# Patient Record
Sex: MALE | Race: BLACK OR AFRICAN AMERICAN | NOT HISPANIC OR LATINO | Employment: STUDENT | URBAN - METROPOLITAN AREA
[De-identification: names, ages, dates, MRNs, and addresses within clinical notes are randomized per-mention and may not be internally consistent; named-entity substitution may affect disease eponyms.]

---
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
## 2021-06-22 ENCOUNTER — OFFICE VISIT (OUTPATIENT)
Dept: FAMILY MEDICINE | Facility: CLINIC | Age: 21
End: 2021-06-22
Payer: COMMERCIAL

## 2021-06-22 ENCOUNTER — LAB VISIT (OUTPATIENT)
Dept: LAB | Facility: HOSPITAL | Age: 21
End: 2021-06-22
Attending: FAMILY MEDICINE
Payer: COMMERCIAL

## 2021-06-22 VITALS
SYSTOLIC BLOOD PRESSURE: 124 MMHG | WEIGHT: 220.69 LBS | HEART RATE: 60 BPM | OXYGEN SATURATION: 98 % | BODY MASS INDEX: 34.64 KG/M2 | DIASTOLIC BLOOD PRESSURE: 84 MMHG | HEIGHT: 67 IN

## 2021-06-22 DIAGNOSIS — Z11.4 ENCOUNTER FOR SCREENING FOR HIV: ICD-10-CM

## 2021-06-22 DIAGNOSIS — E66.09 CLASS 1 OBESITY DUE TO EXCESS CALORIES WITH BODY MASS INDEX (BMI) OF 34.0 TO 34.9 IN ADULT, UNSPECIFIED WHETHER SERIOUS COMORBIDITY PRESENT: ICD-10-CM

## 2021-06-22 DIAGNOSIS — Z11.3 ROUTINE SCREENING FOR STI (SEXUALLY TRANSMITTED INFECTION): ICD-10-CM

## 2021-06-22 DIAGNOSIS — Z00.01 ENCOUNTER FOR GENERAL ADULT MEDICAL EXAMINATION WITH ABNORMAL FINDINGS: ICD-10-CM

## 2021-06-22 DIAGNOSIS — Z00.01 ENCOUNTER FOR GENERAL ADULT MEDICAL EXAMINATION WITH ABNORMAL FINDINGS: Primary | ICD-10-CM

## 2021-06-22 DIAGNOSIS — J45.20 MILD INTERMITTENT ASTHMA WITHOUT COMPLICATION: ICD-10-CM

## 2021-06-22 DIAGNOSIS — K59.09 CHRONIC CONSTIPATION: ICD-10-CM

## 2021-06-22 DIAGNOSIS — F90.2 ATTENTION DEFICIT HYPERACTIVITY DISORDER (ADHD), COMBINED TYPE: ICD-10-CM

## 2021-06-22 DIAGNOSIS — Z11.59 NEED FOR HEPATITIS C SCREENING TEST: ICD-10-CM

## 2021-06-22 PROBLEM — E66.811 CLASS 1 OBESITY DUE TO EXCESS CALORIES WITH BODY MASS INDEX (BMI) OF 34.0 TO 34.9 IN ADULT: Status: ACTIVE | Noted: 2021-06-22

## 2021-06-22 LAB
BASOPHILS # BLD AUTO: 0.05 K/UL (ref 0–0.2)
BASOPHILS NFR BLD: 1.1 % (ref 0–1.9)
DIFFERENTIAL METHOD: NORMAL
EOSINOPHIL # BLD AUTO: 0.1 K/UL (ref 0–0.5)
EOSINOPHIL NFR BLD: 2.2 % (ref 0–8)
ERYTHROCYTE [DISTWIDTH] IN BLOOD BY AUTOMATED COUNT: 12.5 % (ref 11.5–14.5)
HCT VFR BLD AUTO: 50 % (ref 40–54)
HGB BLD-MCNC: 16.2 G/DL (ref 14–18)
IMM GRANULOCYTES # BLD AUTO: 0.02 K/UL (ref 0–0.04)
IMM GRANULOCYTES NFR BLD AUTO: 0.4 % (ref 0–0.5)
LYMPHOCYTES # BLD AUTO: 1.5 K/UL (ref 1–4.8)
LYMPHOCYTES NFR BLD: 33.3 % (ref 18–48)
MCH RBC QN AUTO: 27.6 PG (ref 27–31)
MCHC RBC AUTO-ENTMCNC: 32.4 G/DL (ref 32–36)
MCV RBC AUTO: 85 FL (ref 82–98)
MONOCYTES # BLD AUTO: 0.5 K/UL (ref 0.3–1)
MONOCYTES NFR BLD: 10.4 % (ref 4–15)
NEUTROPHILS # BLD AUTO: 2.4 K/UL (ref 1.8–7.7)
NEUTROPHILS NFR BLD: 52.6 % (ref 38–73)
NRBC BLD-RTO: 0 /100 WBC
PLATELET # BLD AUTO: 249 K/UL (ref 150–450)
PMV BLD AUTO: 11 FL (ref 9.2–12.9)
RBC # BLD AUTO: 5.88 M/UL (ref 4.6–6.2)
WBC # BLD AUTO: 4.62 K/UL (ref 3.9–12.7)

## 2021-06-22 PROCEDURE — 99999 PR PBB SHADOW E&M-EST. PATIENT-LVL III: CPT | Mod: PBBFAC,,, | Performed by: FAMILY MEDICINE

## 2021-06-22 PROCEDURE — 80061 LIPID PANEL: CPT | Performed by: FAMILY MEDICINE

## 2021-06-22 PROCEDURE — 83036 HEMOGLOBIN GLYCOSYLATED A1C: CPT | Performed by: FAMILY MEDICINE

## 2021-06-22 PROCEDURE — 85025 COMPLETE CBC W/AUTO DIFF WBC: CPT | Performed by: FAMILY MEDICINE

## 2021-06-22 PROCEDURE — 80074 ACUTE HEPATITIS PANEL: CPT | Performed by: FAMILY MEDICINE

## 2021-06-22 PROCEDURE — 3008F BODY MASS INDEX DOCD: CPT | Mod: CPTII,S$GLB,, | Performed by: FAMILY MEDICINE

## 2021-06-22 PROCEDURE — 3008F PR BODY MASS INDEX (BMI) DOCUMENTED: ICD-10-PCS | Mod: CPTII,S$GLB,, | Performed by: FAMILY MEDICINE

## 2021-06-22 PROCEDURE — 86703 HIV-1/HIV-2 1 RESULT ANTBDY: CPT | Performed by: FAMILY MEDICINE

## 2021-06-22 PROCEDURE — 86592 SYPHILIS TEST NON-TREP QUAL: CPT | Performed by: FAMILY MEDICINE

## 2021-06-22 PROCEDURE — 80053 COMPREHEN METABOLIC PANEL: CPT | Performed by: FAMILY MEDICINE

## 2021-06-22 PROCEDURE — 84443 ASSAY THYROID STIM HORMONE: CPT | Performed by: FAMILY MEDICINE

## 2021-06-22 PROCEDURE — 82306 VITAMIN D 25 HYDROXY: CPT | Performed by: FAMILY MEDICINE

## 2021-06-22 PROCEDURE — 1126F PR PAIN SEVERITY QUANTIFIED, NO PAIN PRESENT: ICD-10-PCS | Mod: S$GLB,,, | Performed by: FAMILY MEDICINE

## 2021-06-22 PROCEDURE — 99999 PR PBB SHADOW E&M-EST. PATIENT-LVL III: ICD-10-PCS | Mod: PBBFAC,,, | Performed by: FAMILY MEDICINE

## 2021-06-22 PROCEDURE — 1126F AMNT PAIN NOTED NONE PRSNT: CPT | Mod: S$GLB,,, | Performed by: FAMILY MEDICINE

## 2021-06-22 PROCEDURE — 99395 PREV VISIT EST AGE 18-39: CPT | Mod: S$GLB,,, | Performed by: FAMILY MEDICINE

## 2021-06-22 PROCEDURE — 99395 PR PREVENTIVE VISIT,EST,18-39: ICD-10-PCS | Mod: S$GLB,,, | Performed by: FAMILY MEDICINE

## 2021-06-22 PROCEDURE — 36415 COLL VENOUS BLD VENIPUNCTURE: CPT | Mod: PO | Performed by: FAMILY MEDICINE

## 2021-06-22 RX ORDER — ALBUTEROL SULFATE 90 UG/1
2 AEROSOL, METERED RESPIRATORY (INHALATION) EVERY 6 HOURS PRN
Qty: 18 G | Refills: 2 | Status: SHIPPED | OUTPATIENT
Start: 2021-06-22 | End: 2021-12-21 | Stop reason: SDUPTHER

## 2021-06-22 RX ORDER — POLYETHYLENE GLYCOL 3350 17 G/17G
POWDER, FOR SOLUTION ORAL
Qty: 510 G | Refills: 3 | Status: SHIPPED | OUTPATIENT
Start: 2021-06-22 | End: 2021-12-21

## 2021-06-22 RX ORDER — LISDEXAMFETAMINE DIMESYLATE CAPSULES 20 MG/1
20 CAPSULE ORAL EVERY MORNING
Qty: 90 CAPSULE | Refills: 0 | Status: SHIPPED | OUTPATIENT
Start: 2021-06-22 | End: 2021-09-21 | Stop reason: SDUPTHER

## 2021-06-23 LAB
25(OH)D3+25(OH)D2 SERPL-MCNC: 15 NG/ML (ref 30–96)
ALBUMIN SERPL BCP-MCNC: 4.3 G/DL (ref 3.5–5.2)
ALP SERPL-CCNC: 54 U/L (ref 55–135)
ALT SERPL W/O P-5'-P-CCNC: 24 U/L (ref 10–44)
ANION GAP SERPL CALC-SCNC: 12 MMOL/L (ref 8–16)
AST SERPL-CCNC: 25 U/L (ref 10–40)
BILIRUB SERPL-MCNC: 0.9 MG/DL (ref 0.1–1)
BUN SERPL-MCNC: 12 MG/DL (ref 6–20)
CALCIUM SERPL-MCNC: 10 MG/DL (ref 8.7–10.5)
CHLORIDE SERPL-SCNC: 102 MMOL/L (ref 95–110)
CHOLEST SERPL-MCNC: 143 MG/DL (ref 120–199)
CHOLEST/HDLC SERPL: 2.6 {RATIO} (ref 2–5)
CO2 SERPL-SCNC: 23 MMOL/L (ref 23–29)
CREAT SERPL-MCNC: 1 MG/DL (ref 0.5–1.4)
EST. GFR  (AFRICAN AMERICAN): >60 ML/MIN/1.73 M^2
EST. GFR  (NON AFRICAN AMERICAN): >60 ML/MIN/1.73 M^2
ESTIMATED AVG GLUCOSE: 103 MG/DL (ref 68–131)
GLUCOSE SERPL-MCNC: 83 MG/DL (ref 70–110)
HAV IGM SERPL QL IA: NEGATIVE
HBA1C MFR BLD: 5.2 % (ref 4–5.6)
HBV CORE IGM SERPL QL IA: NEGATIVE
HBV SURFACE AG SERPL QL IA: NEGATIVE
HCV AB SERPL QL IA: NEGATIVE
HDLC SERPL-MCNC: 56 MG/DL (ref 40–75)
HDLC SERPL: 39.2 % (ref 20–50)
HIV 1+2 AB+HIV1 P24 AG SERPL QL IA: NEGATIVE
LDLC SERPL CALC-MCNC: 72 MG/DL (ref 63–159)
NONHDLC SERPL-MCNC: 87 MG/DL
POTASSIUM SERPL-SCNC: 4.2 MMOL/L (ref 3.5–5.1)
PROT SERPL-MCNC: 8.2 G/DL (ref 6–8.4)
RPR SER QL: NORMAL
SODIUM SERPL-SCNC: 137 MMOL/L (ref 136–145)
TRIGL SERPL-MCNC: 75 MG/DL (ref 30–150)
TSH SERPL DL<=0.005 MIU/L-ACNC: 1.1 UIU/ML (ref 0.4–4)

## 2021-09-21 ENCOUNTER — PATIENT MESSAGE (OUTPATIENT)
Dept: FAMILY MEDICINE | Facility: CLINIC | Age: 21
End: 2021-09-21

## 2021-09-21 ENCOUNTER — OFFICE VISIT (OUTPATIENT)
Dept: FAMILY MEDICINE | Facility: CLINIC | Age: 21
End: 2021-09-21
Payer: COMMERCIAL

## 2021-09-21 VITALS — WEIGHT: 190 LBS | BODY MASS INDEX: 29.76 KG/M2

## 2021-09-21 DIAGNOSIS — M62.838 MUSCLE SPASMS OF NECK: ICD-10-CM

## 2021-09-21 DIAGNOSIS — K02.9 PAIN DUE TO DENTAL CARIES: ICD-10-CM

## 2021-09-21 DIAGNOSIS — F90.2 ATTENTION DEFICIT HYPERACTIVITY DISORDER (ADHD), COMBINED TYPE: Primary | ICD-10-CM

## 2021-09-21 DIAGNOSIS — Z23 IMMUNIZATION DUE: ICD-10-CM

## 2021-09-21 PROCEDURE — 3044F PR MOST RECENT HEMOGLOBIN A1C LEVEL <7.0%: ICD-10-PCS | Mod: CPTII,95,, | Performed by: FAMILY MEDICINE

## 2021-09-21 PROCEDURE — 3008F BODY MASS INDEX DOCD: CPT | Mod: CPTII,95,, | Performed by: FAMILY MEDICINE

## 2021-09-21 PROCEDURE — 1160F RVW MEDS BY RX/DR IN RCRD: CPT | Mod: CPTII,95,, | Performed by: FAMILY MEDICINE

## 2021-09-21 PROCEDURE — 3008F PR BODY MASS INDEX (BMI) DOCUMENTED: ICD-10-PCS | Mod: CPTII,95,, | Performed by: FAMILY MEDICINE

## 2021-09-21 PROCEDURE — 3044F HG A1C LEVEL LT 7.0%: CPT | Mod: CPTII,95,, | Performed by: FAMILY MEDICINE

## 2021-09-21 PROCEDURE — 99213 OFFICE O/P EST LOW 20 MIN: CPT | Mod: 95,,, | Performed by: FAMILY MEDICINE

## 2021-09-21 PROCEDURE — 1160F PR REVIEW ALL MEDS BY PRESCRIBER/CLIN PHARMACIST DOCUMENTED: ICD-10-PCS | Mod: CPTII,95,, | Performed by: FAMILY MEDICINE

## 2021-09-21 PROCEDURE — 99213 PR OFFICE/OUTPT VISIT, EST, LEVL III, 20-29 MIN: ICD-10-PCS | Mod: 95,,, | Performed by: FAMILY MEDICINE

## 2021-09-21 PROCEDURE — 1159F PR MEDICATION LIST DOCUMENTED IN MEDICAL RECORD: ICD-10-PCS | Mod: CPTII,95,, | Performed by: FAMILY MEDICINE

## 2021-09-21 PROCEDURE — 1159F MED LIST DOCD IN RCRD: CPT | Mod: CPTII,95,, | Performed by: FAMILY MEDICINE

## 2021-09-21 RX ORDER — IBUPROFEN 800 MG/1
800 TABLET ORAL 3 TIMES DAILY PRN
Qty: 90 TABLET | Refills: 0 | Status: SHIPPED | OUTPATIENT
Start: 2021-09-21 | End: 2022-03-31 | Stop reason: SDUPTHER

## 2021-09-21 RX ORDER — LISDEXAMFETAMINE DIMESYLATE CAPSULES 20 MG/1
20 CAPSULE ORAL EVERY MORNING
Qty: 90 CAPSULE | Refills: 0 | Status: SHIPPED | OUTPATIENT
Start: 2021-09-21 | End: 2021-12-21 | Stop reason: SDUPTHER

## 2021-09-23 ENCOUNTER — CLINICAL SUPPORT (OUTPATIENT)
Dept: FAMILY MEDICINE | Facility: CLINIC | Age: 21
End: 2021-09-23
Payer: COMMERCIAL

## 2021-09-23 DIAGNOSIS — Z23 NEED FOR INFLUENZA VACCINATION: Primary | ICD-10-CM

## 2021-09-23 PROCEDURE — 90471 IMMUNIZATION ADMIN: CPT | Mod: S$GLB,,, | Performed by: FAMILY MEDICINE

## 2021-09-23 PROCEDURE — 90686 IIV4 VACC NO PRSV 0.5 ML IM: CPT | Mod: S$GLB,,, | Performed by: FAMILY MEDICINE

## 2021-09-23 PROCEDURE — 90471 FLU VACCINE (QUAD) GREATER THAN OR EQUAL TO 3YO PRESERVATIVE FREE IM: ICD-10-PCS | Mod: S$GLB,,, | Performed by: FAMILY MEDICINE

## 2021-09-23 PROCEDURE — 90686 FLU VACCINE (QUAD) GREATER THAN OR EQUAL TO 3YO PRESERVATIVE FREE IM: ICD-10-PCS | Mod: S$GLB,,, | Performed by: FAMILY MEDICINE

## 2021-10-08 ENCOUNTER — PATIENT MESSAGE (OUTPATIENT)
Dept: FAMILY MEDICINE | Facility: CLINIC | Age: 21
End: 2021-10-08

## 2021-12-21 ENCOUNTER — OFFICE VISIT (OUTPATIENT)
Dept: FAMILY MEDICINE | Facility: CLINIC | Age: 21
End: 2021-12-21
Payer: COMMERCIAL

## 2021-12-21 DIAGNOSIS — F90.2 ATTENTION DEFICIT HYPERACTIVITY DISORDER (ADHD), COMBINED TYPE: Primary | ICD-10-CM

## 2021-12-21 DIAGNOSIS — J45.20 MILD INTERMITTENT ASTHMA WITHOUT COMPLICATION: ICD-10-CM

## 2021-12-21 DIAGNOSIS — E66.09 CLASS 1 OBESITY DUE TO EXCESS CALORIES WITH BODY MASS INDEX (BMI) OF 34.0 TO 34.9 IN ADULT, UNSPECIFIED WHETHER SERIOUS COMORBIDITY PRESENT: ICD-10-CM

## 2021-12-21 PROCEDURE — 99213 PR OFFICE/OUTPT VISIT, EST, LEVL III, 20-29 MIN: ICD-10-PCS | Mod: 95,,, | Performed by: FAMILY MEDICINE

## 2021-12-21 PROCEDURE — 99213 OFFICE O/P EST LOW 20 MIN: CPT | Mod: 95,,, | Performed by: FAMILY MEDICINE

## 2021-12-21 RX ORDER — ALBUTEROL SULFATE 90 UG/1
2 AEROSOL, METERED RESPIRATORY (INHALATION) EVERY 6 HOURS PRN
Qty: 18 G | Refills: 2 | Status: SHIPPED | OUTPATIENT
Start: 2021-12-21 | End: 2022-03-21 | Stop reason: SDUPTHER

## 2021-12-21 RX ORDER — FLUTICASONE PROPIONATE 110 UG/1
2 AEROSOL, METERED RESPIRATORY (INHALATION) EVERY 12 HOURS
Qty: 12 G | Refills: 11 | Status: SHIPPED | OUTPATIENT
Start: 2021-12-21 | End: 2022-03-21 | Stop reason: SDUPTHER

## 2021-12-21 RX ORDER — LISDEXAMFETAMINE DIMESYLATE CAPSULES 20 MG/1
20 CAPSULE ORAL EVERY MORNING
Qty: 90 CAPSULE | Refills: 0 | Status: SHIPPED | OUTPATIENT
Start: 2021-12-21 | End: 2022-03-21 | Stop reason: SDUPTHER

## 2021-12-21 RX ORDER — FLUTICASONE PROPIONATE 110 UG/1
1 AEROSOL, METERED RESPIRATORY (INHALATION) EVERY 12 HOURS
Qty: 12 G | Refills: 11 | Status: SHIPPED | OUTPATIENT
Start: 2021-12-21 | End: 2021-12-21

## 2022-03-21 ENCOUNTER — OFFICE VISIT (OUTPATIENT)
Dept: FAMILY MEDICINE | Facility: CLINIC | Age: 22
End: 2022-03-21
Payer: COMMERCIAL

## 2022-03-21 DIAGNOSIS — J45.20 MILD INTERMITTENT ASTHMA WITHOUT COMPLICATION: ICD-10-CM

## 2022-03-21 DIAGNOSIS — F90.2 ATTENTION DEFICIT HYPERACTIVITY DISORDER (ADHD), COMBINED TYPE: ICD-10-CM

## 2022-03-21 PROCEDURE — 1159F MED LIST DOCD IN RCRD: CPT | Mod: CPTII,95,, | Performed by: FAMILY MEDICINE

## 2022-03-21 PROCEDURE — 1160F RVW MEDS BY RX/DR IN RCRD: CPT | Mod: CPTII,95,, | Performed by: FAMILY MEDICINE

## 2022-03-21 PROCEDURE — 1159F PR MEDICATION LIST DOCUMENTED IN MEDICAL RECORD: ICD-10-PCS | Mod: CPTII,95,, | Performed by: FAMILY MEDICINE

## 2022-03-21 PROCEDURE — 1160F PR REVIEW ALL MEDS BY PRESCRIBER/CLIN PHARMACIST DOCUMENTED: ICD-10-PCS | Mod: CPTII,95,, | Performed by: FAMILY MEDICINE

## 2022-03-21 PROCEDURE — 99213 PR OFFICE/OUTPT VISIT, EST, LEVL III, 20-29 MIN: ICD-10-PCS | Mod: 95,,, | Performed by: FAMILY MEDICINE

## 2022-03-21 PROCEDURE — 99213 OFFICE O/P EST LOW 20 MIN: CPT | Mod: 95,,, | Performed by: FAMILY MEDICINE

## 2022-03-21 RX ORDER — ALBUTEROL SULFATE 90 UG/1
2 AEROSOL, METERED RESPIRATORY (INHALATION) EVERY 6 HOURS PRN
Qty: 18 G | Refills: 2 | Status: SHIPPED | OUTPATIENT
Start: 2022-03-21 | End: 2023-03-29 | Stop reason: SDUPTHER

## 2022-03-21 RX ORDER — LISDEXAMFETAMINE DIMESYLATE CAPSULES 20 MG/1
20 CAPSULE ORAL EVERY MORNING
Qty: 90 CAPSULE | Refills: 0 | Status: SHIPPED | OUTPATIENT
Start: 2022-03-21 | End: 2022-06-21 | Stop reason: SDUPTHER

## 2022-03-21 RX ORDER — FLUTICASONE PROPIONATE 110 UG/1
2 AEROSOL, METERED RESPIRATORY (INHALATION) EVERY 12 HOURS
Qty: 12 G | Refills: 11 | Status: SHIPPED | OUTPATIENT
Start: 2022-03-21 | End: 2023-03-29

## 2022-03-31 ENCOUNTER — OFFICE VISIT (OUTPATIENT)
Dept: FAMILY MEDICINE | Facility: CLINIC | Age: 22
End: 2022-03-31
Payer: COMMERCIAL

## 2022-03-31 VITALS
SYSTOLIC BLOOD PRESSURE: 98 MMHG | DIASTOLIC BLOOD PRESSURE: 60 MMHG | WEIGHT: 202.81 LBS | HEART RATE: 63 BPM | BODY MASS INDEX: 31.77 KG/M2 | OXYGEN SATURATION: 97 %

## 2022-03-31 DIAGNOSIS — G89.29 CHRONIC PAIN OF LEFT WRIST: Primary | ICD-10-CM

## 2022-03-31 DIAGNOSIS — M25.532 CHRONIC PAIN OF LEFT WRIST: Primary | ICD-10-CM

## 2022-03-31 DIAGNOSIS — S66.819A STRAIN OF EXTENSOR TENDON OF WRIST: ICD-10-CM

## 2022-03-31 PROCEDURE — 1159F PR MEDICATION LIST DOCUMENTED IN MEDICAL RECORD: ICD-10-PCS | Mod: CPTII,S$GLB,, | Performed by: FAMILY MEDICINE

## 2022-03-31 PROCEDURE — 99999 PR PBB SHADOW E&M-EST. PATIENT-LVL IV: CPT | Mod: PBBFAC,,, | Performed by: FAMILY MEDICINE

## 2022-03-31 PROCEDURE — 3008F PR BODY MASS INDEX (BMI) DOCUMENTED: ICD-10-PCS | Mod: CPTII,S$GLB,, | Performed by: FAMILY MEDICINE

## 2022-03-31 PROCEDURE — 1159F MED LIST DOCD IN RCRD: CPT | Mod: CPTII,S$GLB,, | Performed by: FAMILY MEDICINE

## 2022-03-31 PROCEDURE — 3078F DIAST BP <80 MM HG: CPT | Mod: CPTII,S$GLB,, | Performed by: FAMILY MEDICINE

## 2022-03-31 PROCEDURE — 3078F PR MOST RECENT DIASTOLIC BLOOD PRESSURE < 80 MM HG: ICD-10-PCS | Mod: CPTII,S$GLB,, | Performed by: FAMILY MEDICINE

## 2022-03-31 PROCEDURE — 1160F PR REVIEW ALL MEDS BY PRESCRIBER/CLIN PHARMACIST DOCUMENTED: ICD-10-PCS | Mod: CPTII,S$GLB,, | Performed by: FAMILY MEDICINE

## 2022-03-31 PROCEDURE — 3008F BODY MASS INDEX DOCD: CPT | Mod: CPTII,S$GLB,, | Performed by: FAMILY MEDICINE

## 2022-03-31 PROCEDURE — 99213 OFFICE O/P EST LOW 20 MIN: CPT | Mod: S$GLB,,, | Performed by: FAMILY MEDICINE

## 2022-03-31 PROCEDURE — 1160F RVW MEDS BY RX/DR IN RCRD: CPT | Mod: CPTII,S$GLB,, | Performed by: FAMILY MEDICINE

## 2022-03-31 PROCEDURE — 99213 PR OFFICE/OUTPT VISIT, EST, LEVL III, 20-29 MIN: ICD-10-PCS | Mod: S$GLB,,, | Performed by: FAMILY MEDICINE

## 2022-03-31 PROCEDURE — 3074F PR MOST RECENT SYSTOLIC BLOOD PRESSURE < 130 MM HG: ICD-10-PCS | Mod: CPTII,S$GLB,, | Performed by: FAMILY MEDICINE

## 2022-03-31 PROCEDURE — 99999 PR PBB SHADOW E&M-EST. PATIENT-LVL IV: ICD-10-PCS | Mod: PBBFAC,,, | Performed by: FAMILY MEDICINE

## 2022-03-31 PROCEDURE — 3074F SYST BP LT 130 MM HG: CPT | Mod: CPTII,S$GLB,, | Performed by: FAMILY MEDICINE

## 2022-03-31 RX ORDER — IBUPROFEN 800 MG/1
800 TABLET ORAL 3 TIMES DAILY PRN
Qty: 90 TABLET | Refills: 0 | Status: SHIPPED | OUTPATIENT
Start: 2022-03-31

## 2022-03-31 NOTE — LETTER
March 31, 2022      [facility]  [address]  [place] [address]  Phone: [phone]  Fax: [phone]       Patient: Liyah Fowler   YOB: 2000  Date of Visit: 03/31/2022    To Whom It May Concern:    Seven Fowler  was at [facility] on 03/31/2022. The patient may return to work/school on 3/31/2022 with restrictions - please allow to not lift anything above 5lbs with left wrist. If you have any questions or concerns, or if I can be of further assistance, please do not hesitate to contact me.    Sincerely,                                   Trina Bowen MD                                 Family Medicine/ Primary Care

## 2022-03-31 NOTE — PROGRESS NOTES
Subjective:       Patient ID: Liyah Fowler is a 21 y.o. male.    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 Worse over last 3 weeks. Reports that he has pain with pronation against resistance or lifting with wrist in pronated position.     Reports that 2 years ago had a football related injury where wrist was hyperextended. Xrays unremarkable at that time. Brace worn for some time. No PT at that time. Recovered. Noted some intermittent bothersome pains in the wrist with overuse, but none lasted over last 2 years.     Review of Systems   Constitutional: Negative for activity change and unexpected weight change.   HENT: Negative for hearing loss, rhinorrhea and trouble swallowing.    Eyes: Negative for discharge and visual disturbance.   Respiratory: Negative for chest tightness and wheezing.    Cardiovascular: Negative for chest pain and palpitations.   Gastrointestinal: Negative for blood in stool, constipation, diarrhea and vomiting.   Endocrine: Negative for polydipsia.   Genitourinary: Negative for difficulty urinating, hematuria and urgency.   Musculoskeletal: Negative for arthralgias, joint swelling and neck pain.   Neurological: Negative for weakness and headaches.   Psychiatric/Behavioral: Negative for confusion and dysphoric mood.        Objective:     Vitals:    03/31/22 0733   BP: 98/60   Pulse: 63        Physical Exam  Musculoskeletal:      Right wrist: Normal.      Left wrist: Tenderness present. No swelling, deformity or snuff box tenderness. Normal range of motion. Normal pulse.      Comments: Strength normal on R and L, ROM WNL - flexion, extension, ulnar and radial deviation,  supination, pronation. Left wrist with pain with pronation against resistance. Reports unable to hold heavy items when left wrist in pronated position.          Assessment:       1. Chronic pain of left wrist    2. Strain of extensor tendon of wrist        Plan:         Chronic pain of left wrist  Strain of extensor tendon of wrist  -     X-Ray Wrist Complete Left; Future; Expected date: 03/31/2022  -     ibuprofen (ADVIL,MOTRIN) 800 MG tablet; Take 1 tablet (800 mg total) by mouth 3 (three) times daily as needed for Pain.  Dispense: 90 tablet; Refill: 0  -     Ambulatory referral/consult to Physical/Occupational Therapy; Future; Expected date: 04/07/2022  - Conservative mgmt reviewed, NSAIDs, rest and reduce use. Work note provided.     Patient's questions answered. Plan reviewed with patient at the end of visit. Relevant precautions to chief complaint and reasons to seek medical care or contact the office sooner reviewed with patient.     Follow up if symptoms worsen or fail to improve.

## 2022-04-04 ENCOUNTER — HOSPITAL ENCOUNTER (OUTPATIENT)
Dept: RADIOLOGY | Facility: HOSPITAL | Age: 22
Discharge: HOME | End: 2022-04-04
Attending: FAMILY MEDICINE
Payer: COMMERCIAL

## 2022-04-04 DIAGNOSIS — S66.819A STRAIN OF EXTENSOR TENDON OF WRIST: ICD-10-CM

## 2022-04-04 DIAGNOSIS — M25.532 CHRONIC PAIN OF LEFT WRIST: ICD-10-CM

## 2022-04-04 DIAGNOSIS — G89.29 CHRONIC PAIN OF LEFT WRIST: ICD-10-CM

## 2022-04-04 PROCEDURE — 73110 XR WRIST COMPLETE 3 VIEWS LEFT: ICD-10-PCS | Mod: 26,LT,, | Performed by: RADIOLOGY

## 2022-04-04 PROCEDURE — 73110 X-RAY EXAM OF WRIST: CPT | Mod: TC,FY,PO,LT

## 2022-04-04 PROCEDURE — 73110 X-RAY EXAM OF WRIST: CPT | Mod: 26,LT,, | Performed by: RADIOLOGY

## 2022-04-06 ENCOUNTER — CLINICAL SUPPORT (OUTPATIENT)
Dept: REHABILITATION | Facility: HOSPITAL | Age: 22
End: 2022-04-06
Attending: FAMILY MEDICINE
Payer: COMMERCIAL

## 2022-04-06 DIAGNOSIS — S66.819A STRAIN OF EXTENSOR TENDON OF WRIST: ICD-10-CM

## 2022-04-06 DIAGNOSIS — G89.29 CHRONIC PAIN OF LEFT WRIST: ICD-10-CM

## 2022-04-06 DIAGNOSIS — M25.532 CHRONIC PAIN OF LEFT WRIST: ICD-10-CM

## 2022-04-06 PROCEDURE — 97110 THERAPEUTIC EXERCISES: CPT | Mod: PN

## 2022-04-06 PROCEDURE — 97165 OT EVAL LOW COMPLEX 30 MIN: CPT | Mod: PN

## 2022-04-06 NOTE — PLAN OF CARE
[facility] Occupational Therapy  Initial Evaluation     Date: 4/6/2022  Name: Liyah Fowler  Clinic Number: [id number]    Therapy Diagnosis:   Encounter Diagnoses   Name Primary?    Chronic pain of left wrist     Strain of extensor tendon of wrist      Physician: Trina Bowen MD    Physician Orders: Eval and tx   Medical Diagnosis: L wrist pain   Surgical Procedure and Date: None, / Date of Injury/Onset: 2 years ago aggressive extension of the wrist and works for [employer] so the past month this has been hurting.   Evaluation Date: 4/6/22  Insurance Authorization Period Expiration: 12/31/22  Plan of Care Certification Period: 6/3/22  Date of Return to MD: LAZ    Visit # / Visits authorized: 1 / 1    FOTO: initial eval  Medicare Amount:     Time In:10:55  Time Out: 1135 am   Total treatment time: 40 minutes  Total Timed minutes: 40 minutes    Precautions:  Standard and Weightbearing    Subjective     Involved Side: L   Dominant Side: Right  Date of Onset: 2 years ago but more recently with work its been bothering him   Mechanism of Injury: Excessive lifting and forceful repetitive movements, states a prior football injury could have caused this as well.   History of Current Condition: See above pt states excessive use at work.   Surgical Procedure: None  Imaging: bone scan films   Previous Therapy: None for the wrist     Past Medical History/Physical Systems Review:   Liyah Fowler  has a past medical history of ADHD (attention deficit hyperactivity disorder) and Asthma, currently dormant.    Liyah Fowler  has no past surgical history on file.    Liyah has a current medication list which includes the following prescription(s): albuterol, fluticasone propionate, ibuprofen, and lisdexamfetamine.    Review of patient's allergies indicates:  No Known Allergies     Patient's Goals for Therapy: Increase use and reduce pain     Pain:  Functional Pain Scale Rating 0-10:   3/10 on average  0/10 at  best  6/10 at worst  Location: Ulnar side of the wrist only. 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   Working presently: employed  Duties: Lifting and moving boxes lifting and moving 50 to hundred pounds    Functional Limitations/Social History:    Previous functional status includes: Independent with all ADLs.     Current FunctionalStatus   Home/Living environment : lives alone      Limitation of Functional Status as follows:   ADLs/IADLs:     - Feeding: IND    - Bathing: IND    - Dressing/Grooming: IND    - Driving: IND     Leisure: lifting weights and cutting grass are affected.         Objective     Observation/Appearance: Minor swelling. States pain with ulnar aspect of wrist.    Edema. Measured in centimeters.   4/6/2022 4/6/2022    Right Left   2in. Above elbow     2in. Below elbow     Wrist Crease 16.8 17   Distal Palmar Crease 21 21   MCPs 20 19.8     Edema. Measured in centimeters. WNL    4/6/2022 4/6/2022    Right Left   Ring:       P1            6.5 6.5    PIP                  Elbow and Wrist ROM. Measured in degrees.   4/6/2022 4/6/2022    Right Left   Supination/Pronation  51/wnl   Wrist Ext/Flex  55/60   Wrist RD/UD  28/28        Strength (Dynamometer) and Pinch Strength (Pinch Gauge)  Measured in pounds.   4/6/2022 4/6/2022    Right Left   Rung II 80 56   Sosa Pinch 23 11   3pt Pinch 16 13   2pt Pinch 12 8       Manual Muscle Test   4/6/2022 4/6/2022    Right Left   Wrist Extension      Wrist Flexion     Radial Deviation     Ulnar Deviation     Supination     Pronation     Elbow Extension     Elbow Flexion           Special Tests  ORL Test    Froment's Sign neg   Pinch OK Sign neg   Ashley's Sign  neg   Egawa Sign  neg   Clamp Sign  neg   SL Ballottement Test pos   LT Ballottement Test pos   Scaphoid/WatsonTest neg   Piano Key Test pos   ECU Synergy Test neg    Ulnar Compression Test neg   TFCC Load Test pos   Ulnocarpal Stress Test pos       CMS Impairment/Limitation/Restriction for FOTO Hand Survey    Therapist reviewed FOTO scores for Liyah Fowler on 4/6/2022.   FOTO documents entered into [employer] - see Media section.    Limitation Score: 46%         Treatment     Treatment Time In: 1120  Treatment Time Out: 1135  Total Treatment time separate from Evaluation time:15    Liyah received the following supervised modalities after being cleared for contradictions for 5 minutes:   -Paraffin w/ MHP to L hand for 5 min, pre-tx to decrease pain & increase tissue extensibility      Liyah received therapeutic exercises for 10 minutes including:  -Pt provided with written instructions, demonstration and education of HEP with pt demonstrating and verbalizing understanding of appropriate movements and techniques to increase strength, ROM and activity tolerance for increased IND.      Home Exercise Program/Education:  Issued HEP (see patient instructions in EMR) and educated on modality use for pain management . Exercises were reviewed and iLyah was able to demonstrate them prior to the end of the session.   Pt received a written copy of exercises to perform at home. Liyah demonstrated fair  understanding of the education provided.  Pt was advised to perform these exercises free of pain, and to stop performing them if pain occurs.    Patient/Family Education: role of OT, goals for OT, scheduling/cancellations - pt verbalized understanding. Discussed insurance limitations with patient.    Additional Education provided: HEP and education to wear wrist brace in place    Assessment     Liyah Fowler is a 21 y.o. male referred to outpatient occupational therapy and presents with a medical diagnosis of L wrist pain, resulting in weakness, pain and discomfort and demonstrates limitations as described in the chart below. Following medical record review it is determined that pt will  benefit from occupational therapy services in order to maximize pain free and/or functional use of left wrist. The following goals were discussed with the patient and patient is in agreement with them as to be addressed in the treatment plan. The patient's rehab potential is Good.     Anticipated barriers to occupational therapy: possible TFCC injury or ligament involvement.   Pt has no cultural, educational or language barriers to learning provided.    Profile and History Assessment of Occupational Performance Level of Clinical Decision Making Complexity Score   Occupational Profile:   Liyah Fowler is a 21 y.o. male who lives alone and is currently employed as [employer] and handling [employer]. Liyah Fowler has difficulty with  bathing, grooming and dressing  driving/transportation management, shopping, phone/computer use, housework/household chores and medication management  affecting his/her daily functional abilities. His/her main goal for therapy is incrase use and reduce pain with increased  strengthening .     Comorbidities:   level of undertstanding of current condition    Medical and Therapy History Review:   Brief               Performance Deficits    Physical:  Joint Mobility  Joint Stability  Muscle Power/Strength  Muscle Endurance  Edema   Strength  Pinch Strength  Pain    Cognitive:  Safety Awareness/Insight to Disability    Psychosocial:    Habits  Routines  Rituals     Clinical Decision Making:  low    Assessment Process:  Detailed Assessments    Modification/Need for Assistance:  Minimal-Moderate Modifications/Assistance    Intervention Selection:  Several Treatment Options       moderate  Based on PMHX, co morbidities , data from assessments and functional level of assistance required with task and clinical presentation directly impacting function.         Goals:   The following goals were discussed with the patient and patient is in agreement with them as to be addressed in the  treatment plan.   Short term Goals:  1) Initiate HEP  2) Pt will increase AROM of L wrist specifically supination by 5-10 degrees in order to assist with functional carrying moving and handling by 4 weeks.  3) Pt will reduce edema by .5-1 cm in affected fingers by 4 weeks.  4) Pt will reduce pain to less than 4/10 by 4 weeks.  5) Pt will increase functional  strength by 5# in order to A in opening containers for med management or home management tasks by 4 weeks.   6) Patient will be able to achieve less than or equal to 25% on the FOTO, demonstrating overall improved functional ability with upper extremity. (Self-care category)    Long Term Goals:  Goals to be met by discharge:  1) Independent with HEP  2) Pt will increase L  strength to within 10% of dominant RUE for functional and prolonged  for work related tasks by d/c.    3) Pt will decrease edema to trace or none to increase functional ROM by d/c.   4) Pt will decrease pain to trace or none while completing light home management tasks or work related tasks by d/c.   5) Patient will be able to achieve less than or equal to 15% on the FOTO, demonstrating overall improved functional ability with upper extremity.  (Self-care category)        Plan   Certification Period/Plan of care expiration: 4/6/2022 to 6/3/22.    Outpatient Occupational Therapy 2 times weekly for 8 weeks to include the following interventions: Paraffin, Fluidotherapy, Manual therapy/joint mobilizations, Modalities for pain management, US 3 mhz, Therapeutic exercises/activities., Strengthening, Edema Control, Joint Protection and Energy Conservation.      Gerard Rincon, OT

## 2022-04-06 NOTE — PATIENT INSTRUCTIONS
"    [facility], OCCUPATIONAL THERAPY  HOME EXERCISE PROGRAM     Complete the following two massages for 2 minutes, 3x/day:                                                       Complete the following exercises for 10-15 repetitions, 2-3x/day:         AROM: Supination / Pronation   With your elbow by your side, turn your   palm up then turn your palm down.      AROM: Wrist Flexion / Extension               Bend your wrist forward and back as far as possible.          AROM: Wrist Radial / Ulnar Deviation  Bend your wrist from side to side as far as possible.          AROM: Isolated MCP Flexion / Extension ("Wave")   Bend only your large, bottom knuckles. Hold 3 seconds.   Keep the tips of your fingers straight. Straighten fingers.      AROM: Isolated IPJ Flexion / Extension ("Hook")   Bend only your middle and end knuckles. Hold 3 seconds.   Straighten your fingers.       AROM: MCP and PIP Flexion / Extension ("Straight Fist")  Bend your bottom and middle knuckles, keeping the tips of your fingers straight.   Try to touch the pads of your fingers on your palm. Hold 3 seconds.   Straighten your fingers.       AROM: Composite Flexion / Extension ("Full Fist")  Bend every joint in your hand into a fist. Hold 3 seconds.   Straighten your fingers.         AROM: Composte Extension ("Finger Lifts")  Lift your finger off of the table one at a time. Hold 3 seconds.   Relax your finger.      AROM: Abduction / Adduction  With hand flat on table, spread all fingers apart,   then bring them together as close as possible.          Holding pen, move wrist as you were throwing a dart lightly, repeat 10 times, pain free    Therapist: Gerard Rincon, OTR/L         "

## 2022-04-13 NOTE — PROGRESS NOTES
Occupational Therapy Daily Treatment Note     Name: Liyah Fowler  Clinic Number: [id number]    Therapy Diagnosis:   Encounter Diagnoses   Name Primary?    Chronic pain of left wrist      Strain of extensor tendon of wrist      Physician: Trina Bowen MD    Physician orders: Eval and tx     Visit Date: 4/14/2022    Medical Diagnosis:   M25.532,G89.29 (ICD-10-CM) - Chronic pain of left wrist   S66.819A (ICD-10-CM) - Strain of extensor tendon of wrist     Evaluation Date: 4/6/22  Insurance Authorization Period Expiration: 12/31/22  Plan of Care Certification Period: 6/3/22  Date of Return to MD: LAZ     Visit # / Visits authorized: 2 / 50      Time In: 0702  Time Out:  0745 am   Total treatment time: 40 minutes       Precautions:  Standard and Weightbearing    Subjective     Pt reports: Some increased pain at worst today. Pt reports wrapping his wrist for work tasks helps some.  he was compliant with HEP.   Response to previous treatment:Good overall  Functional change: None reported today    Pain:  Functional Pain Scale Rating 0-10:   3/10 on average  0/10 at best  8/10 at worst  Location: Ulnar side of the wrist only. Supination is painful  Description: Aching and Dull  Aggravating Factors: Flexing and Lifting  Easing Factors: rest and wrist brace    Objective   MEASUREMENTS  4/14/2022 AROM wrist/forearm:  Flex = 60 from eval and 70 (post heat) on 4/14/2022  Ext = 55 from eval and 60 (post heat)  Supination = 51 from eval and 55 (post heat)    TREATMENT  Liyah received the following supervised modalities after being cleared for contradictions for 10 minutes:   - Fluiodtherapy at 115 degrees L hand/wrist.    Liyah received the following direct contact modalities after being cleared for contraindications for 0 minutes:  -NT    Liyah received the following manual therapy techniques for 8 minutes:   - STM/retrograde massage to L wrist    Liyah received therapeutic exercises for 22 minutes including:  - Wrist  circles 2x20   - AROM wrist flexion and ext 2x20  - Weighted wrist stretches with 1# DB 10x10 sec holds.  - Wrist wheel 1 min each wrist flexion, extension, and supination.  - UD 3x10 1# DB with 2 sec holds    Liyah participated in dynamic functional therapeutic activities to improve functional performance for 0  minutes, including:  -NT    Home Exercises and Education Provided     Education provided:   -  Cont per previous. Cold pack prn post work shift. Possible benefit from wrist widget.    Written Home Exercises Provided:  Patient instructed to cont prior HEP.    Exercises were reviewed and Liyah was able to demonstrate them prior to the end of the session.  Liyah demonstrated good  understanding of the education provided.     See EMR under Media for exercises provided today and/or prior visit.        Assessment     Pt would continue to benefit from skilled OT. Pt with good response to initial tx. Recommended cold pack application 5-10 min after is work shifts to assist with ongoing pain/inflammation L wrist. Cont per current poc.     - Progress towards goals:  STG #1 has been met.    Liyah is progressing well towards his goals . Pt continues with good rehab potential.     Pt will continue to benefit from skilled outpatient occupational therapy to address the deficits listed in the problem list on initial evaluation in order to maximize pt's level of independence in the home and community.     Anticipated barriers to occupational therapy: None    Pt's spiritual, cultural and educational needs considered and pt agreeable to plan of care and goals.    Goals:    Short term Goals:  1) Initiate HEP (MET)  2) Pt will increase AROM of L wrist specifically supination by 5-10 degrees in order to assist with functional carrying moving and handling by 4 weeks.  3) Pt will reduce edema by .5-1 cm in affected fingers by 4 weeks.  4) Pt will reduce pain to less than 4/10 by 4 weeks.  5) Pt will increase functional   strength by 5# in order to A in opening containers for med management or home management tasks by 4 weeks.   6) Patient will be able to achieve less than or equal to 25% on the FOTO, demonstrating overall improved functional ability with upper extremity. (Self-care category)     Long Term Goals:  Goals to be met by discharge:  1) Independent with HEP  2) Pt will increase L  strength to within 10% of dominant RUE for functional and prolonged  for work related tasks by d/c.    3) Pt will decrease edema to trace or none to increase functional ROM by d/c.   4) Pt will decrease pain to trace or none while completing light home management tasks or work related tasks by d/c.   5) Patient will be able to achieve less than or equal to 15% on the FOTO, demonstrating overall improved functional ability with upper extremity.  (Self-care category)       Plan   Continue Occupational Therapy 2 times per week through current poc expiration date of 6/3/2022, in order to to decrease pain and edema, and increase A/PROM, strength, and functional use of left upper extremity.    Updates/Grading for next session:  Progress with OT as tolerated.      PORFIRIO Carter, CHT

## 2022-04-14 ENCOUNTER — CLINICAL SUPPORT (OUTPATIENT)
Dept: REHABILITATION | Facility: HOSPITAL | Age: 22
End: 2022-04-14
Payer: COMMERCIAL

## 2022-04-14 DIAGNOSIS — M25.532 LEFT WRIST PAIN: ICD-10-CM

## 2022-04-14 PROCEDURE — 97022 WHIRLPOOL THERAPY: CPT | Mod: PN

## 2022-04-14 PROCEDURE — 97140 MANUAL THERAPY 1/> REGIONS: CPT | Mod: PN

## 2022-04-14 PROCEDURE — 97110 THERAPEUTIC EXERCISES: CPT | Mod: PN

## 2022-05-11 NOTE — PROGRESS NOTES
"  Occupational Therapy Daily Treatment Note     Name: Liyah Fowler  Clinic Number: [id number]    Therapy Diagnosis:   Encounter Diagnoses   Name Primary?    Chronic pain of left wrist      Strain of extensor tendon of wrist      Physician: Trina Bowen MD    Physician orders: Eval and tx     Visit Date: 5/12/2022    Medical Diagnosis:   M25.532,G89.29 (ICD-10-CM) - Chronic pain of left wrist   S66.819A (ICD-10-CM) - Strain of extensor tendon of wrist     Evaluation Date: 4/6/22  Insurance Authorization Period Expiration: 12/31/22  Plan of Care Certification Period: 6/3/22  Date of Return to MD: LAZ     Visit # / Visits authorized: 3 / 50  FOTO at Eval: 46%      Time In: 10:45 am  Time Out:  11:40 am   Total treatment time: 55 minutes     Precautions:  Standard and Weightbearing    Subjective     Pt reports: 5/12: Pt explains wearing wrist "wrap" for work at times to support the wrist during lifting. Pt discussed fluctuating pain since original injury until these past few months.   he was compliant with HEP.   Response to previous treatment:Good overall  Functional change: None reported today    Pain:  Functional Pain Scale Rating 0-10:   4/10 on average  0/10 at best  8/10 at worst  Location: Ulnar side of the wrist only. Supination is painful  Description: Aching and Dull  Aggravating Factors: Supination, Wrist flexion, Carrying, and Lifting  Easing Factors: rest and wrist brace    Objective     Observations: Left distal ulna is prominent as compared to the right. He avoids supination; will only move into supination very slowly. He otherwise uses his right hand to physically rotate the forearm.    Special Tests:    4/6/22 5/12/22    Left R/L   Tinel's Test at [facility] nt -/+   S-L Ballottement + defer   L-T Ballottement Test + defer   Isaac's Test + defer   Extrinsic Tightness Test nt defer   Intrinsic Tightness Test nt -/-*   Champion's Shift  nt defer   Froment's Sign - -   Ashley's Sign  - -   Piano Key Test  " + -/-   ECU Synergy  - -/-   Ulnocarpal Stress TEst + defer   TFCC Load + -/+   Fovea Sign nt -/+   * pain presents in ulna wrist with efforts to hold hook fist position    EDEMA (Girth in cm)   R/L Right/Left   DATE 4/6/22 5/12/22   Wrist 16.8/17.0 16.8/17.1   DPC 21/21 nt   TransMC 20.0/19.8 21.2/20.7   SF P1 nt 5.5/5.3   Forearm (4cm distal to elbow crease) nt 31.5/30.7     Wrist & FOREARM AROM   Left Right/Left   DATE 4/6/22 5/12/22   Wrist EXT 55 65/54   Wrist FLX 60 67/70   Wrist RD 28 nt/27   Wrist UD 28 nt/28   Wrist BROWN 171 Nt/179 (+8)        SUP WNL/51 80/59* (+8)   PRO WNL/WNL 77/78   *pt moves slowly into supination to reduce pain and then stops @ 59 degrees     Strength and Pinch Strength (in psi)    4/6/2022 4/6/2022 5/12/22     Right Left Left   Rung II 80 56 34* (-22)   Sosa Pinch 23 11 14 (+3)   3pt Pinch 16 13 7 (+6)   2pt Pinch 12 8 nt   * pain start      Manual Muscle Test   as compared to the Right 5/12/2022     Left   Wrist Extension  4    Wrist Flexion 4    Radial Deviation  4*   Ulnar Deviation 4    Supination 3+*    Pronation 4    Elbow Extension 4+    Elbow Flexion 4+     * pain at ulna wrist presents       Treatment      Liyah received the following supervised modalities after being cleared for contradictions for 10 minutes:   - Paraffin wax heat pre-tx for promoting healing, decreasing pain and increasing tissue extensibility    Liyah received the following direct contact modalities after being cleared for contraindications for 0 minutes:  -NT    Liyah received the following manual therapy techniques for 6 minutes:   - R/A girth  - Elastic tape applied with space correction at ulna wrist for reducing pain and edema     Liyah received therapeutic exercises for 25 minutes including:  Wrist and forearm AROM 1x10 each:  - ext/flx  - RD/UD  - pron/supination   Wrist isometrics Green t-band, 2 min each:  - Wrist extension  - Attempted and discontinued: Rad Deviation           Objective  measures 5/12: All R/A including special testing  5/12: Assessed baseline wrist strength   HEP  5/12: OT recommends wrist support to wear during work and examination from hand specialist; Dr. White's reference provided. Wrist AROM issued for performing 4-6x/day if wearing wrist brace during work.       Home Exercises and Education Provided     Education provided:   - Seek examination from Hand Specialist  - Wrist brace recommended for work  -  Cont per previous. Cold pack prn post work shift. Possible benefit from wrist widget.    Written Home Exercises Provided:  Patient instructed to cont prior HEP.    Exercises were reviewed and Liyah was able to demonstrate them prior to the end of the session.  Liyah demonstrated good  understanding of the education provided.     See EMR under Media for exercises provided today and/or prior visit.        Assessment   5/12: Mr. Fowler's 3rd OT visit. He has pain that is now persisting x 3-4 months and unaffected by the wrap he is currently wearing during work. Special testing results in highly suspect TFCC injury. OT recommends Hand Specialist examination and wrist brace until further orders from the specialist are received.    Liyah is progressing slowly towards his goals . Pt continues with fair rehab potential.     Pt will continue to benefit from skilled outpatient occupational therapy to address the deficits listed in the problem list on initial evaluation in order to maximize pt's level of independence in the home and community.     Anticipated barriers to occupational therapy: Highly Suspect TFCC injury    Pt's spiritual, cultural and educational needs considered and pt agreeable to plan of care and goals.    Goals:    Short term Goals:  1) Initiate HEP (MET)  2) Pt will increase AROM of L wrist specifically supination by 5-10 degrees in order to assist with functional carrying moving and handling by 4 weeks. Met, yet pt avoids supination due to ongoing increases in  pain beyond ~55 degreees  3) Pt will reduce edema by .5-1 cm in affected fingers by 4 weeks. Not Met  4) Pt will reduce pain to less than 4/10 by 4 weeks. Not Met  5) Pt will increase functional  strength by 5# in order to A in opening containers for med management or home management tasks by 4 weeks.   6) Patient will be able to achieve less than or equal to 25% on the FOTO, demonstrating overall improved functional ability with upper extremity. (Self-care category)     Long Term Goals:  Goals to be met by discharge:  1) Independent with HEP  2) Pt will increase L  strength to within 10% of dominant RUE for functional and prolonged  for work related tasks by d/c.    3) Pt will decrease edema to trace or none to increase functional ROM by d/c.   4) Pt will decrease pain to trace or none while completing light home management tasks or work related tasks by d/c.   5) Patient will be able to achieve less than or equal to 15% on the FOTO, demonstrating overall improved functional ability with upper extremity.  (Self-care category)       Plan   5/12: OT recommends advice from Hand Specialist.     Continue Occupational Therapy 2 times per week through current poc expiration date of 6/3/2022, in order to to decrease pain and edema, and increase A/PROM, strength, and functional use of left upper extremity.    Updates/Grading for next session:  Progress with OT as tolerated.    PORFIRIO Short, CYNT  Occupational Therapist, Certified Hand Therapist

## 2022-05-12 ENCOUNTER — CLINICAL SUPPORT (OUTPATIENT)
Dept: REHABILITATION | Facility: HOSPITAL | Age: 22
End: 2022-05-12
Payer: COMMERCIAL

## 2022-05-12 DIAGNOSIS — M25.532 LEFT WRIST PAIN: Primary | ICD-10-CM

## 2022-05-12 PROCEDURE — 97018 PARAFFIN BATH THERAPY: CPT | Mod: 59,PN

## 2022-05-12 PROCEDURE — 97140 MANUAL THERAPY 1/> REGIONS: CPT | Mod: PN

## 2022-05-12 PROCEDURE — 97110 THERAPEUTIC EXERCISES: CPT | Mod: PN

## 2022-05-12 NOTE — PATIENT INSTRUCTIONS
GET AN APPOINTMENT WITH DR. OLIVA. UNTIL HE GIVES YOU OTHER ORDERS,    Instructions: wear WRIST brace to support the wrist     WEAR THE WRIST BRACE WHEN PARTICIPATING IN WORK OR HOUSEHOLD ACTIVITIES THAT MAY REPRODUCE PAIN IN YOUR Wrist.     REMOVE THE BRACE AT LEAST 5-6 TIMES A DAY TO PERFORM GENTLE WRIST AROM FOR PREVENTING STIFFNESS.     PERFORM THE EXERCISES INSTRUCTED BY YOUR THERAPIST WHILE THE WRIST BRACE IS REMOVED.    IT IS OK TO SLEEP IN THE BRACE IF YOU HAVE WORN IT DURING THE DAY FOR UP TO 3 HOURS WITHOUT DEVELOPING DISCOMFORT

## 2022-05-19 ENCOUNTER — CLINICAL SUPPORT (OUTPATIENT)
Dept: REHABILITATION | Facility: HOSPITAL | Age: 22
End: 2022-05-19
Payer: COMMERCIAL

## 2022-05-19 DIAGNOSIS — M25.532 LEFT WRIST PAIN: Primary | ICD-10-CM

## 2022-05-19 PROCEDURE — 97022 WHIRLPOOL THERAPY: CPT | Mod: PN

## 2022-05-19 NOTE — PROGRESS NOTES
"  Occupational Therapy Daily Treatment Note     Name: Liyah Fowler  Clinic Number: [id number]    Therapy Diagnosis:   Encounter Diagnoses   Name Primary?    Chronic pain of left wrist      Strain of extensor tendon of wrist      Physician: Trina Bowen MD    Physician orders: Eval and tx     Visit Date: 5/19/2022    Medical Diagnosis:   M25.532,G89.29 (ICD-10-CM) - Chronic pain of left wrist   S66.819A (ICD-10-CM) - Strain of extensor tendon of wrist     Evaluation Date: 4/6/22  Insurance Authorization Period Expiration: 12/31/22  Plan of Care Certification Period: 6/3/22  Date of Return to MD: LAZ     Visit # / Visits authorized: 4 / 50  FOTO at Eval: 46%      Time In: 10:10 am  Time Out:  10:40 am   Total treatment time: 30 minutes     Precautions:  Standard and Weightbearing    Subjective     Pt reports: 5/19: Pt recalls having rigid taping when playing sports after his initial injury that would help reduce pain and episodes of pain while playing. He was receptive to this application today and will see Dr. White on 5/23 for examination.   5/12: Pt explains wearing wrist "wrap" for work at times to support the wrist during lifting. Pt discussed fluctuating pain since original injury until these past few months.   he was compliant with HEP.   Response to previous treatment: No significant change in pain with elastic tape; reduced swelling with patient follow through with wrist brace wear since encouraged last session.  Functional change: None reported today    Pain:  Functional Pain Scale Rating 0-10:   4/10 on average  0/10 at best  8/10 at worst  Location: Ulnar side of the wrist only. Supination is painful  Description: Aching and Dull  Aggravating Factors: Supination, Wrist flexion, Carrying, and Lifting  Easing Factors: rest and wrist brace    Objective     Observations: Left distal ulna is prominent as compared to the right. He avoids supination; will only move into supination very slowly. He otherwise uses " his right hand to physically rotate the forearm into full pronated position.    Special Tests:    4/6/22 5/12/22    Left R/L   Tinel's Test at [place] nt -/+   S-L Ballottement + defer   L-T Ballottement Test + defer   Isaac's Test + defer   Extrinsic Tightness Test nt defer   Intrinsic Tightness Test nt -/-*   Champion's Shift  nt defer   Froment's Sign - -   Ashley's Sign  - -   Piano Key Test  + -/-   ECU Synergy  - -/-   Ulnocarpal Stress TEst + defer   TFCC Load + -/+   Fovea Sign nt -/+   * pain presents in ulna wrist with efforts to hold hook fist position    EDEMA (Girth in cm)   R/L Right/Left Right   DATE 4/6/22 5/12/22 5/19/22   Wrist 16.8/17.0 16.8/17.1 16.8/17.0 (-.1)   [facility]PC 21/21 nt    [facility] 20.0/19.8 21.2/20.7    SF P1 nt 5.5/5.3    Forearm (4cm distal to elbow crease) nt 31.5/30.7      Wrist & FOREARM AROM   Left Right/Left   DATE 4/6/22 5/12/22   Wrist EXT 55 65/54   Wrist FLX 60 67/70   Wrist RD 28 nt/27   Wrist UD 28 nt/28   Wrist BROWN 171 Nt/179 (+8)        SUP WNL/51 80/59* (+8)   PRO WNL/WNL 77/78   *pt moves slowly into supination to reduce pain and then stops @ 59 degrees     Strength and Pinch Strength (in psi)    4/6/2022 4/6/2022 5/12/22     Right Left Left   Rung II 80 56 34* (-22)   Sosa Pinch 23 11 14 (+3)   3pt Pinch 16 13 7 (+6)   2pt Pinch 12 8 nt   * pain start      Manual Muscle Test   as compared to the Right 5/12/2022     Left   Wrist Extension  4    Wrist Flexion 4    Radial Deviation  4*   Ulnar Deviation 4    Supination 3+*    Pronation 4    Elbow Extension 4+    Elbow Flexion 4+     * pain at ulna wrist presents       Treatment      Liyah received the following supervised modalities after being cleared for contradictions for 15 minutes:   - Fluidotherapy for promoting healing, reducing pain, desensitization and increasing tissue extensibility  - defer-Paraffin wax heat pre-tx for promoting healing, decreasing pain and increasing tissue extensibility    Liyah bae  the following direct contact modalities after being cleared for contraindications for 0 minutes:  -NT    Liyah received the following manual therapy techniques for 5 minutes:   - R/A girth  - Elastic tape applied with space correction at ulna wrist for reducing pain and edema     Liyah received therapeutic exercises for 5 minutes including:  Wrist and forearm AROM 1x30 each:  - pron/supination   Wrist isometrics-defer Green t-band, 2 min each:  - Wrist extension  - Attempted and discontinued: Rad Deviation           Objective measures 5/12: All R/A including special testing  5/12: Assessed baseline wrist strength   HEP  5/12: OT recommends wrist support to wear during work and examination from hand specialist; Dr. White's reference provided. Wrist AROM issued for performing 4-6x/day if wearing wrist brace during work.       Home Exercises and Education Provided     Education provided:   - Seek examination from Hand Specialist  - Wrist brace recommended for work  -  Cont per previous. Cold pack prn post work shift. Possible benefit from wrist widget.    Written Home Exercises Provided:  Patient instructed to cont prior HEP.    Exercises were reviewed and Liyah was able to demonstrate them prior to the end of the session.  Liyah demonstrated good  understanding of the education provided.     See EMR under Media for exercises provided today and/or prior visit.        Assessment   5/19: No change in pain with elastic tape. Decreased swelling with increased commitment to wrist brace wear. Rigid tape applied for trial. Special testing results in highly suspect TFCC injury. OT recommends Hand Specialist examination and wrist brace until further orders from the specialist are received.    Liyah is progressing slowly towards his goals . Pt continues with guarded rehab potential.     Pt will continue to benefit from skilled outpatient occupational therapy to address the deficits listed in the problem list on initial  evaluation in order to maximize pt's level of independence in the home and community.     Anticipated barriers to occupational therapy: Highly Suspect TFCC injury    Pt's spiritual, cultural and educational needs considered and pt agreeable to plan of care and goals.    Goals:    Short term Goals:  1) Initiate HEP (MET)  2) Pt will increase AROM of L wrist specifically supination by 5-10 degrees in order to assist with functional carrying moving and handling by 4 weeks. Met, yet pt avoids supination due to ongoing increases in pain beyond ~55 degreees  3) Pt will reduce edema by .5-1 cm in affected fingers by 4 weeks. Not Met  4) Pt will reduce pain to less than 4/10 by 4 weeks. Not Met  5) Pt will increase functional  strength by 5# in order to A in opening containers for med management or home management tasks by 4 weeks.   6) Patient will be able to achieve less than or equal to 25% on the FOTO, demonstrating overall improved functional ability with upper extremity. (Self-care category)     Long Term Goals:  Goals to be met by discharge:  1) Independent with HEP  2) Pt will increase L  strength to within 10% of dominant RUE for functional and prolonged  for work related tasks by d/c.    3) Pt will decrease edema to trace or none to increase functional ROM by d/c.   4) Pt will decrease pain to trace or none while completing light home management tasks or work related tasks by d/c.   5) Patient will be able to achieve less than or equal to 15% on the FOTO, demonstrating overall improved functional ability with upper extremity.  (Self-care category)       Plan   5/19: Continue promoting healing & preventing stiffness while waiting for hand specialist exam. Teach tape application if this trial proves to reduce episodes of pain together with the wrist brace.    Continue Occupational Therapy 2 times per week through current poc expiration date of 6/3/2022, in order to to decrease pain and edema, and  increase A/PROM, strength, and functional use of left upper extremity.    PORFIRIO Short, CYNT  Occupational Therapist, Certified Hand Therapist

## 2022-05-23 ENCOUNTER — OFFICE VISIT (OUTPATIENT)
Dept: ORTHOPEDICS | Facility: CLINIC | Age: 22
End: 2022-05-23
Payer: COMMERCIAL

## 2022-05-23 VITALS — HEIGHT: 67 IN | WEIGHT: 202.81 LBS | BODY MASS INDEX: 31.83 KG/M2

## 2022-05-23 DIAGNOSIS — M25.532 LEFT WRIST PAIN: Primary | ICD-10-CM

## 2022-05-23 PROCEDURE — 99999 PR PBB SHADOW E&M-EST. PATIENT-LVL III: ICD-10-PCS | Mod: PBBFAC,,, | Performed by: ORTHOPAEDIC SURGERY

## 2022-05-23 PROCEDURE — 99203 OFFICE O/P NEW LOW 30 MIN: CPT | Mod: 25,S$GLB,, | Performed by: ORTHOPAEDIC SURGERY

## 2022-05-23 PROCEDURE — 99999 PR PBB SHADOW E&M-EST. PATIENT-LVL III: CPT | Mod: PBBFAC,,, | Performed by: ORTHOPAEDIC SURGERY

## 2022-05-23 PROCEDURE — 1159F PR MEDICATION LIST DOCUMENTED IN MEDICAL RECORD: ICD-10-PCS | Mod: CPTII,S$GLB,, | Performed by: ORTHOPAEDIC SURGERY

## 2022-05-23 PROCEDURE — 20605 DRAIN/INJ JOINT/BURSA W/O US: CPT | Mod: LT,S$GLB,, | Performed by: ORTHOPAEDIC SURGERY

## 2022-05-23 PROCEDURE — 1159F MED LIST DOCD IN RCRD: CPT | Mod: CPTII,S$GLB,, | Performed by: ORTHOPAEDIC SURGERY

## 2022-05-23 PROCEDURE — 3008F PR BODY MASS INDEX (BMI) DOCUMENTED: ICD-10-PCS | Mod: CPTII,S$GLB,, | Performed by: ORTHOPAEDIC SURGERY

## 2022-05-23 PROCEDURE — 99203 PR OFFICE/OUTPT VISIT, NEW, LEVL III, 30-44 MIN: ICD-10-PCS | Mod: 25,S$GLB,, | Performed by: ORTHOPAEDIC SURGERY

## 2022-05-23 PROCEDURE — 3008F BODY MASS INDEX DOCD: CPT | Mod: CPTII,S$GLB,, | Performed by: ORTHOPAEDIC SURGERY

## 2022-05-23 PROCEDURE — 20605 PR DRAIN/INJECT INTERMEDIATE JOINT/BURSA: ICD-10-PCS | Mod: LT,S$GLB,, | Performed by: ORTHOPAEDIC SURGERY

## 2022-05-23 RX ORDER — TRIAMCINOLONE ACETONIDE 40 MG/ML
20 INJECTION, SUSPENSION INTRA-ARTICULAR; INTRAMUSCULAR
Status: COMPLETED | OUTPATIENT
Start: 2022-05-23 | End: 2022-05-23

## 2022-05-23 RX ADMIN — TRIAMCINOLONE ACETONIDE 20 MG: 40 INJECTION, SUSPENSION INTRA-ARTICULAR; INTRAMUSCULAR at 09:05

## 2022-05-23 NOTE — PROGRESS NOTES
"Subjective:      Patient ID: Liyah Fowler is a 21 y.o. male.    Chief Complaint: Wrist Pain (left )      HPI  Liyah Fowler is a  21 y.o. male presenting today for pain left wrist.  There was a history of trauma.  Onset of symptoms began 3 years ago when he injured his left wrist playing football  He has sort treated himself did not really have x-rays at that time but has had continued pain in the wrist  He is currently doing lot of lifting at work and this seems to have aggravated his symptoms  The pain is located to the ulnar side left wrist no numbness or tingling reported.      Review of patient's allergies indicates:  No Known Allergies      Current Outpatient Medications   Medication Sig Dispense Refill    lisdexamfetamine (VYVANSE) 20 MG capsule Take 1 capsule (20 mg total) by mouth every morning. 90 capsule 0    albuterol (VENTOLIN HFA) 90 mcg/actuation inhaler Inhale 2 puffs into the lungs every 6 (six) hours as needed for Wheezing or Shortness of Breath. Rescue (Patient not taking: Reported on 5/23/2022) 18 g 2    fluticasone propionate (FLOVENT HFA) 110 mcg/actuation inhaler Inhale 2 puffs into the lungs every 12 (twelve) hours. Controller (Patient not taking: Reported on 5/23/2022) 12 g 11    ibuprofen (ADVIL,MOTRIN) 800 MG tablet Take 1 tablet (800 mg total) by mouth 3 (three) times daily as needed for Pain. (Patient not taking: Reported on 5/23/2022) 90 tablet 0     No current facility-administered medications for this visit.       Past Medical History:   Diagnosis Date    ADHD (attention deficit hyperactivity disorder)     Asthma, currently dormant        No past surgical history on file.    Review of Systems:  ROS    OBJECTIVE:     PHYSICAL EXAM:  Height: 5' 7" (170.2 cm) Weight: 92 kg (202 lb 13.2 oz)  Vitals:    05/23/22 0905   Weight: 92 kg (202 lb 13.2 oz)   Height: 5' 7" (1.702 m)   PainSc:   3   PainLoc: Wrist     Well developed, well nourished male in no acute distress  Alert and " oriented x 3  HEENT- Normal exam  Lungs- Clear to auscultation  Heart- Regular rate and rhythm  Abdomen- Soft nontender  Extremity exam- examination left wrist demonstrates some tenderness on the ulnar side near the ulnar styloid  Mild swelling  Range of motion wrist fingers full  Mild pain on ulnar deviation  There is some mild instability to the DRUJ no clicking or popping however full pronation supination slight pain  Neurologic exam intact  Tinel sign negative    RADIOGRAPHS:  AP lateral x-ray left wrist demonstrates no significant abnormalities other than hypo plastic ulnar styloid which may represent a congenital condition or posttraumatic  Comments: I have personally reviewed the imaging and I agree with the above radiologist's report.    ASSESSMENT/PLAN:     IMPRESSION:  Ulnar-sided left wrist pain    PLAN:  Since this happened after an injury during football this could represent a chronic TFC tear of the left wrist  I explained this to him today  I have given him a wrist brace for use at work  Recommended injection today  After pause for time-out identified the left wrist which was injected ulnar carpal joint combination Kenalog 20 mg 0.5 cc xylocaine sterile technique  Tolerated the procedure well without complication  Advil or Motrin by mouth  Follow-up 4-6 weeks if symptoms persist we may get an MRI scan left wrist       - We talked at length about the anatomy and pathophysiology of   Encounter Diagnosis   Name Primary?    Left wrist pain Yes           Disclaimer: This note has been generated using voice-recognition software. There may be typographical errors that have been missed during proof-reading.

## 2022-05-26 ENCOUNTER — TELEPHONE (OUTPATIENT)
Dept: REHABILITATION | Facility: HOSPITAL | Age: 22
End: 2022-05-26
Payer: COMMERCIAL

## 2022-05-27 ENCOUNTER — TELEPHONE (OUTPATIENT)
Dept: REHABILITATION | Facility: HOSPITAL | Age: 22
End: 2022-05-27
Payer: COMMERCIAL

## 2022-05-27 NOTE — TELEPHONE ENCOUNTER
OT spoke with patient to discuss Dr. White's plan and plan to continue OT for pain-free strengthening.  He has had to cancel these past 2 visits due to need to care for his grandfather.  He confirms his appt for this Wednesday, 6/1 @ 11:00  PORFIRIO Short, BRENT  Occupational Therapist, Certified Hand Therapist

## 2022-05-31 NOTE — PROGRESS NOTES
OT spoke with patient to discuss Dr. White's plan and plan to continue OT for pain-free strengthening.  He has had to cancel these past 2 visits due to need to care for his grandfather.  He confirms his appt for this Wednesday, 6/1 @ 11:00  PORFIRIO Short CHT  Occupational Therapist, Certified Hand Therapist    Occupational Therapy Daily Treatment Note     Name: Liyah Fowler  Clinic Number: [id number]    Therapy Diagnosis:   Encounter Diagnoses   Name Primary?    Chronic pain of left wrist      Strain of extensor tendon of wrist      Physician: Trina Bowen MD    Physician orders: Eval and tx     Visit Date: 6/1/2022    Medical Diagnosis:   M25.532,G89.29 (ICD-10-CM) - Chronic pain of left wrist   S66.819A (ICD-10-CM) - Strain of extensor tendon of wrist     Evaluation Date: 4/6/22  Insurance Authorization Period Expiration: 12/31/22  Plan of Care Certification Period: 6/3/22  Date of Return to MD: NA     Visit # / Visits authorized: 5 / 50  FOTO at Eval: 46%      Time In: 11:00 am  Time Out:  *** am   Total treatment time: *** minutes     Precautions:  Standard and Weightbearing  5/23/22: Dr. White: PLAN:  Since this happened after an injury during football this could represent a chronic TFC tear of the left wrist  I explained this to him today  I have given him a wrist brace for use at work  Recommended injection today  After pause for time-out identified the left wrist which was injected ulnar carpal joint combination Kenalog 20 mg 0.5 cc xylocaine sterile technique  Tolerated the procedure well without complication  Advil or Motrin by mouth  Follow-up 4-6 weeks if symptoms persist we may get an MRI scan left wrist  Subjective     Pt reports: 5/19: Pt recalls having rigid taping when playing sports after his initial injury that would help reduce pain and episodes of pain while playing. He was receptive to this application today and will see Dr. White on 5/23 for examination.   5/12: Pt explains  "wearing wrist "wrap" for work at times to support the wrist during lifting. Pt discussed fluctuating pain since original injury until these past few months.   he was compliant with HEP.   Response to previous treatment: No significant change in pain with elastic tape; reduced swelling with patient follow through with wrist brace wear since encouraged last session.  Functional change: None reported today    Pain:  Functional Pain Scale Rating 0-10:   4/10 on average  0/10 at best  8/10 at worst  Location: Ulnar side of the wrist only. Supination is painful  Description: Aching and Dull  Aggravating Factors: Supination, Wrist flexion, Carrying, and Lifting  Easing Factors: rest and wrist brace    Objective     Observations: Left distal ulna is prominent as compared to the right. He avoids supination; will only move into supination very slowly. He otherwise uses his right hand to physically rotate the forearm into full pronated position.    Special Tests:    4/6/22 5/12/22    Left R/L   Tinel's Test at [facility] nt -/+   S-L Ballottement + defer   L-T Ballottement Test + defer   Isaac's Test + defer   Extrinsic Tightness Test nt defer   Intrinsic Tightness Test nt -/-*   Champion's Shift  nt defer   Froment's Sign - -   Ashley's Sign  - -   Piano Key Test  + -/-   ECU Synergy  - -/-   Ulnocarpal Stress TEst + defer   TFCC Load + -/+   Fovea Sign nt -/+   * pain presents in ulna wrist with efforts to hold hook fist position    EDEMA (Girth in cm)   R/L Right/Left Right   DATE 4/6/22 5/12/22 5/19/22   Wrist 16.8/17.0 16.8/17.1 16.8/17.0 (-.1)   [facility] 21/21 nt    [facility] 20.0/19.8 21.2/20.7    SF P1 nt 5.5/5.3    Forearm (4cm distal to elbow crease) nt 31.5/30.7      Wrist & FOREARM AROM   Left Right/Left   DATE 4/6/22 5/12/22   Wrist EXT 55 65/54   Wrist FLX 60 67/70   Wrist RD 28 nt/27   Wrist UD 28 nt/28   Wrist BROWN 171 Nt/179 (+8)        SUP WNL/51 80/59* (+8)   PRO WNL/WNL 77/78   *pt moves slowly into supination to " reduce pain and then stops @ 59 degrees     Strength and Pinch Strength (in psi)    4/6/2022 4/6/2022 5/12/22     Right Left Left   Rung II 80 56 34* (-22)   Sosa Pinch 23 11 14 (+3)   3pt Pinch 16 13 7 (+6)   2pt Pinch 12 8 nt   * pain start      Manual Muscle Test   as compared to the Right 5/12/2022     Left   Wrist Extension  4    Wrist Flexion 4    Radial Deviation  4*   Ulnar Deviation 4    Supination 3+*    Pronation 4    Elbow Extension 4+    Elbow Flexion 4+     * pain at ulna wrist presents       Treatment      Liyah received the following supervised modalities after being cleared for contradictions for 15 minutes:   - Fluidotherapy for promoting healing, reducing pain, desensitization and increasing tissue extensibility  - defer-Paraffin wax heat pre-tx for promoting healing, decreasing pain and increasing tissue extensibility    Liyah received the following direct contact modalities after being cleared for contraindications for 0 minutes:  -NT    Liyah received the following manual therapy techniques for 5 minutes:   - R/A girth  - Elastic tape applied with space correction at ulna wrist for reducing pain and edema     Liyah received therapeutic exercises for 5 minutes including:  Wrist and forearm AROM 1x30 each:  - pron/supination   Wrist isometrics-defer Green t-band, 2 min each:  - Wrist extension  - Attempted and discontinued: Rad Deviation           Objective measures 5/12: All R/A including special testing  5/12: Assessed baseline wrist strength   HEP  5/12: OT recommends wrist support to wear during work and examination from hand specialist; Dr. White's reference provided. Wrist AROM issued for performing 4-6x/day if wearing wrist brace during work.       Home Exercises and Education Provided     Education provided:   - Seek examination from Hand Specialist  - Wrist brace recommended for work  -  Cont per previous. Cold pack prn post work shift. Possible benefit from wrist  lizzie.    Written Home Exercises Provided:  Patient instructed to cont prior HEP.    Exercises were reviewed and Liyah was able to demonstrate them prior to the end of the session.  Liyah demonstrated good  understanding of the education provided.     See EMR under Media for exercises provided today and/or prior visit.        Assessment   5/19: No change in pain with elastic tape. Decreased swelling with increased commitment to wrist brace wear. Rigid tape applied for trial. Special testing results in highly suspect TFCC injury. OT recommends Hand Specialist examination and wrist brace until further orders from the specialist are received.    Liyah is progressing slowly towards his goals . Pt continues with guarded rehab potential.     Pt will continue to benefit from skilled outpatient occupational therapy to address the deficits listed in the problem list on initial evaluation in order to maximize pt's level of independence in the home and community.     Anticipated barriers to occupational therapy: Highly Suspect TFCC injury    Pt's spiritual, cultural and educational needs considered and pt agreeable to plan of care and goals.    Goals:    Short term Goals:  1) Initiate HEP (MET)  2) Pt will increase AROM of L wrist specifically supination by 5-10 degrees in order to assist with functional carrying moving and handling by 4 weeks. Met, yet pt avoids supination due to ongoing increases in pain beyond ~55 degreees  3) Pt will reduce edema by .5-1 cm in affected fingers by 4 weeks. Not Met  4) Pt will reduce pain to less than 4/10 by 4 weeks. Not Met  5) Pt will increase functional  strength by 5# in order to A in opening containers for med management or home management tasks by 4 weeks.   6) Patient will be able to achieve less than or equal to 25% on the FOTO, demonstrating overall improved functional ability with upper extremity. (Self-care category)     Long Term Goals:  Goals to be met by discharge:  1)  Independent with HEP  2) Pt will increase L  strength to within 10% of dominant RUE for functional and prolonged  for work related tasks by d/c.    3) Pt will decrease edema to trace or none to increase functional ROM by d/c.   4) Pt will decrease pain to trace or none while completing light home management tasks or work related tasks by d/c.   5) Patient will be able to achieve less than or equal to 15% on the FOTO, demonstrating overall improved functional ability with upper extremity.  (Self-care category)       Plan   5/19: Continue promoting healing & preventing stiffness while waiting for hand specialist exam. Teach tape application if this trial proves to reduce episodes of pain together with the wrist brace.    Continue Occupational Therapy 2 times per week through current poc expiration date of 6/3/2022, in order to to decrease pain and edema, and increase A/PROM, strength, and functional use of left upper extremity.    PORFIRIO Short, CYNT  Occupational Therapist, Certified Hand Therapist

## 2022-06-01 ENCOUNTER — CLINICAL SUPPORT (OUTPATIENT)
Dept: REHABILITATION | Facility: HOSPITAL | Age: 22
End: 2022-06-01
Payer: COMMERCIAL

## 2022-06-01 DIAGNOSIS — M25.532 LEFT WRIST PAIN: Primary | ICD-10-CM

## 2022-06-01 PROCEDURE — 97022 WHIRLPOOL THERAPY: CPT | Mod: PN

## 2022-06-01 PROCEDURE — 97110 THERAPEUTIC EXERCISES: CPT | Mod: PN

## 2022-06-01 NOTE — PROGRESS NOTES
Occupational Therapy Daily Treatment Note     Name: Liyah Fowler  Clinic Number: [id number]    Therapy Diagnosis:   Encounter Diagnoses   Name Primary?    Chronic pain of left wrist      Strain of extensor tendon of wrist      Physician: Trina Bowen MD    Physician orders: Eval and tx     Visit Date: 6/1/2022    Medical Diagnosis:   M25.532,G89.29 (ICD-10-CM) - Chronic pain of left wrist   S66.819A (ICD-10-CM) - Strain of extensor tendon of wrist     Evaluation Date: 4/6/22  Insurance Authorization Period Expiration: 12/31/22  Plan of Care Certification Period: 6/3/22  Date of Return to MD: LAZ     Visit # / Visits authorized: 6 / 50  FOTO at Eval: 46%      Time In: 11:07 am  Time Out: 11:55 am   Total treatment time: 48 minutes     Precautions:  Standard and Weightbearing    Subjective     Pt reports: It feels better since they gave me the shot and that gel brace. I feel like its doing ok. But I still notice it when I was doing pullups over the weekend no matter the  I had.    he was compliant with HEP.   Response to previous treatment: No significant change in pain with elastic tape; reduced swelling with patient follow through with wrist brace wear since encouraged last session.  Functional change: None reported today    Pain:  Functional Pain Scale Rating 0-10:   4/10 on average  0/10 at best  8/10 at worst  Location: Ulnar side of the wrist only. Supination is painful  Description: Aching and Dull  Aggravating Factors: Supination, Wrist flexion, Carrying, and Lifting  Easing Factors: rest and wrist brace    Objective     Observations: Left distal ulna is prominent as compared to the right. He avoids supination; will only move into supination very slowly. He otherwise uses his right hand to physically rotate the forearm into full pronated position.    Special Tests:    4/6/22 5/12/22    Left R/L   Tinel's Test at [facility] nt -/+   S-L Ballottement + defer   L-T Ballottement Test + defer   Isaac's Test  + defer   Extrinsic Tightness Test nt defer   Intrinsic Tightness Test nt -/-*   Champion's Shift  nt defer   Froment's Sign - -   Ashley's Sign  - -   Piano Key Test  + -/-   ECU Synergy  - -/-   Ulnocarpal Stress TEst + defer   TFCC Load + -/+   Fovea Sign nt -/+   * pain presents in ulna wrist with efforts to hold hook fist position      Wrist & FOREARM AROM   Left Right/Left L   DATE 4/6/22 5/12/22 6/1/22   Wrist EXT 55 65/54 65   Wrist FLX 60 67/70 75   Wrist RD 28 nt/27 30   Wrist UD 28 nt/28 30   Wrist BROWN 171 Nt/179 (+8)          SUP WNL/51 80/59* (+8) 59   PRO WNL/WNL 77/78 WNL   *pt moves slowly into supination to reduce pain and then stops @ 59 degrees     Strength and Pinch Strength (in psi)    4/6/2022 4/6/2022 5/12/22 6/1/22     Right Left Left L   Rung II 80 56 34* (-22) 60   Sosa Pinch 23 11 14 (+3) 15   3pt Pinch 16 13 7 (+6) 10   2pt Pinch 12 8 nt 8   * pain start      Manual Muscle Test   as compared to the Right 6/1/22     Left   Wrist Extension  5   Wrist Flexion 5    Radial Deviation  5*   Ulnar Deviation 5    Supination 4*    Pronation 5    Elbow Extension 5    Elbow Flexion 5     * pain at ulna wrist presents    Edema. Measured in centimeters.    4/6/2022 6/1/22     Right Left   2in. Above elbow       2in. Below elbow       Wrist Crease 16.8 17   Distal Palmar Crease 21 21   MCPs 20 19.8          Treatment      Liyah received the following supervised modalities after being cleared for contradictions for 10 minutes:   - Fluidotherapy for promoting healing, reducing pain, desensitization and increasing tissue extensibility        Liyah received therapeutic exercises for 38 minutes including:  Wrist and forearm AROM 1x30 each:  - pron/supination   Wrist dextraciser 2 min    Dart throwers 2 min    Wrist wand 2 min    Isometric wrist exercises provided All planes 3/10 sec holds added to HEP   Red t bar Smileys   Frowns  2/10   Oscillations for proprioception  1 min flx/ext   1 min pro/sup    Yoana carrizales attempted in session today not provided for HEP  Good tolerance. No pain. Not added to HEP yet    HEP  Added dart throwers and isometric review from previous sessions he did well.   10 min            Home Exercises and Education Provided     Education provided:   - Seek examination from [facility] Specialist  - Wrist brace recommended for work  -  Cont per previous. Cold pack prn post work shift. Possible benefit from wrist widget.    Written Home Exercises Provided:  Patient instructed to cont prior HEP.    Exercises were reviewed and Liyah was able to demonstrate them prior to the end of the session.  Liyah demonstrated good  understanding of the education provided.     See EMR under Media for exercises provided today and/or prior visit.        Assessment   Pt did well overall today. He states still being very active at home and at work. States doing pull ups with diff  with his wrist still bothering him during that. Discussed need to refrain until fully healed or risk for reinjury. At this time, updated measurements taken with no significant change in status. He is wearing a gel brace with work and lifting stating this helps. OT and pt discussed hold until MD appt on 7/11 to discuss further POC.      Liyah is progressing slowly towards his goals . Pt continues with guarded rehab potential.     Pt will continue to benefit from skilled outpatient occupational therapy to address the deficits listed in the problem list on initial evaluation in order to maximize pt's level of independence in the home and community.     Anticipated barriers to occupational therapy: Highly Suspect TFCC injury    Pt's spiritual, cultural and educational needs considered and pt agreeable to plan of care and goals.    Goals:    Short term Goals:  1) Initiate HEP (MET)  2) Pt will increase AROM of L wrist specifically supination by 5-10 degrees in order to assist with functional carrying moving and handling by 4 weeks.  Met, yet pt avoids supination due to ongoing increases in pain beyond ~55 degreees  3) Pt will reduce edema by .5-1 cm in affected fingers by 4 weeks. Not Met  4) Pt will reduce pain to less than 4/10 by 4 weeks. Not Met  5) Pt will increase functional  strength by 5# in order to A in opening containers for med management or home management tasks by 4 weeks.   6) Patient will be able to achieve less than or equal to 25% on the FOTO, demonstrating overall improved functional ability with upper extremity. (Self-care category)     Long Term Goals:  Goals to be met by discharge:  1) Independent with HEP  2) Pt will increase L  strength to within 10% of dominant RUE for functional and prolonged  for work related tasks by d/c.    3) Pt will decrease edema to trace or none to increase functional ROM by d/c.   4) Pt will decrease pain to trace or none while completing light home management tasks or work related tasks by d/c.   5) Patient will be able to achieve less than or equal to 15% on the FOTO, demonstrating overall improved functional ability with upper extremity.  (Self-care category)       Plan   OT and pt discussed hold until MD appt on 7/11 to discuss further POC. Discussed brace use and prevention from further injury.    Gerard Rincon  OTR/L

## 2022-06-21 ENCOUNTER — LAB VISIT (OUTPATIENT)
Dept: LAB | Facility: HOSPITAL | Age: 22
End: 2022-06-21
Attending: FAMILY MEDICINE
Payer: COMMERCIAL

## 2022-06-21 ENCOUNTER — OFFICE VISIT (OUTPATIENT)
Dept: FAMILY MEDICINE | Facility: CLINIC | Age: 22
End: 2022-06-21
Payer: COMMERCIAL

## 2022-06-21 VITALS
DIASTOLIC BLOOD PRESSURE: 66 MMHG | WEIGHT: 205.5 LBS | BODY MASS INDEX: 32.25 KG/M2 | HEIGHT: 67 IN | SYSTOLIC BLOOD PRESSURE: 116 MMHG | OXYGEN SATURATION: 98 % | HEART RATE: 84 BPM

## 2022-06-21 DIAGNOSIS — Z00.01 ENCOUNTER FOR GENERAL ADULT MEDICAL EXAMINATION WITH ABNORMAL FINDINGS: Primary | ICD-10-CM

## 2022-06-21 DIAGNOSIS — E66.09 CLASS 1 OBESITY DUE TO EXCESS CALORIES WITH BODY MASS INDEX (BMI) OF 32.0 TO 32.9 IN ADULT, UNSPECIFIED WHETHER SERIOUS COMORBIDITY PRESENT: ICD-10-CM

## 2022-06-21 DIAGNOSIS — Z11.3 ROUTINE SCREENING FOR STI (SEXUALLY TRANSMITTED INFECTION): ICD-10-CM

## 2022-06-21 DIAGNOSIS — J45.20 MILD INTERMITTENT ASTHMA WITHOUT COMPLICATION: ICD-10-CM

## 2022-06-21 DIAGNOSIS — F90.9 ATTENTION DEFICIT HYPERACTIVITY DISORDER (ADHD), UNSPECIFIED ADHD TYPE: ICD-10-CM

## 2022-06-21 DIAGNOSIS — F90.2 ATTENTION DEFICIT HYPERACTIVITY DISORDER (ADHD), COMBINED TYPE: ICD-10-CM

## 2022-06-21 DIAGNOSIS — L60.0 INGROWN NAIL OF FIFTH TOE OF LEFT FOOT: ICD-10-CM

## 2022-06-21 PROCEDURE — 99395 PREV VISIT EST AGE 18-39: CPT | Mod: S$GLB,,, | Performed by: FAMILY MEDICINE

## 2022-06-21 PROCEDURE — 1159F PR MEDICATION LIST DOCUMENTED IN MEDICAL RECORD: ICD-10-PCS | Mod: CPTII,S$GLB,, | Performed by: FAMILY MEDICINE

## 2022-06-21 PROCEDURE — 3074F PR MOST RECENT SYSTOLIC BLOOD PRESSURE < 130 MM HG: ICD-10-PCS | Mod: CPTII,S$GLB,, | Performed by: FAMILY MEDICINE

## 2022-06-21 PROCEDURE — 99999 PR PBB SHADOW E&M-EST. PATIENT-LVL III: CPT | Mod: PBBFAC,,, | Performed by: FAMILY MEDICINE

## 2022-06-21 PROCEDURE — 1160F RVW MEDS BY RX/DR IN RCRD: CPT | Mod: CPTII,S$GLB,, | Performed by: FAMILY MEDICINE

## 2022-06-21 PROCEDURE — 99395 PR PREVENTIVE VISIT,EST,18-39: ICD-10-PCS | Mod: S$GLB,,, | Performed by: FAMILY MEDICINE

## 2022-06-21 PROCEDURE — 3008F BODY MASS INDEX DOCD: CPT | Mod: CPTII,S$GLB,, | Performed by: FAMILY MEDICINE

## 2022-06-21 PROCEDURE — 3078F DIAST BP <80 MM HG: CPT | Mod: CPTII,S$GLB,, | Performed by: FAMILY MEDICINE

## 2022-06-21 PROCEDURE — 86592 SYPHILIS TEST NON-TREP QUAL: CPT | Performed by: FAMILY MEDICINE

## 2022-06-21 PROCEDURE — 3008F PR BODY MASS INDEX (BMI) DOCUMENTED: ICD-10-PCS | Mod: CPTII,S$GLB,, | Performed by: FAMILY MEDICINE

## 2022-06-21 PROCEDURE — 1160F PR REVIEW ALL MEDS BY PRESCRIBER/CLIN PHARMACIST DOCUMENTED: ICD-10-PCS | Mod: CPTII,S$GLB,, | Performed by: FAMILY MEDICINE

## 2022-06-21 PROCEDURE — 99999 PR PBB SHADOW E&M-EST. PATIENT-LVL III: ICD-10-PCS | Mod: PBBFAC,,, | Performed by: FAMILY MEDICINE

## 2022-06-21 PROCEDURE — 1159F MED LIST DOCD IN RCRD: CPT | Mod: CPTII,S$GLB,, | Performed by: FAMILY MEDICINE

## 2022-06-21 PROCEDURE — 36415 COLL VENOUS BLD VENIPUNCTURE: CPT | Mod: PO | Performed by: FAMILY MEDICINE

## 2022-06-21 PROCEDURE — 3074F SYST BP LT 130 MM HG: CPT | Mod: CPTII,S$GLB,, | Performed by: FAMILY MEDICINE

## 2022-06-21 PROCEDURE — 87389 HIV-1 AG W/HIV-1&-2 AB AG IA: CPT | Performed by: FAMILY MEDICINE

## 2022-06-21 PROCEDURE — 3078F PR MOST RECENT DIASTOLIC BLOOD PRESSURE < 80 MM HG: ICD-10-PCS | Mod: CPTII,S$GLB,, | Performed by: FAMILY MEDICINE

## 2022-06-21 PROCEDURE — 80048 BASIC METABOLIC PNL TOTAL CA: CPT | Performed by: FAMILY MEDICINE

## 2022-06-21 RX ORDER — LISDEXAMFETAMINE DIMESYLATE CAPSULES 20 MG/1
20 CAPSULE ORAL EVERY MORNING
Qty: 90 CAPSULE | Refills: 0 | Status: SHIPPED | OUTPATIENT
Start: 2022-06-21 | End: 2022-10-04 | Stop reason: SDUPTHER

## 2022-06-21 NOTE — PROGRESS NOTES
Subjective:       Patient ID: Liyah Fowler is a 21 y.o. male.    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 Overall ok. Not using controller inhaler daily, but will be symptomatic 1 month ago. Does improve once he starts using. ADHD stable, meds helpful. Feeling good and noted weight loss with diet and exercise.     Review of Systems   All other systems reviewed and are negative.         Results for orders placed or performed in visit on 06/22/21   C. trachomatis/N. gonorrhoeae by AMP DNA    Specimen: Genital   Result Value Ref Range    Chlamydia, Amplified DNA Not Detected Not Detected    N gonorrhoeae, amplified DNA Not Detected Not Detected     Objective:     Vitals:    06/21/22 1439   BP: 116/66   Pulse: 84        Physical Exam  Vitals and nursing note reviewed.   Constitutional:       General: He is not in acute distress.     Appearance: Normal appearance. He is not ill-appearing, toxic-appearing or diaphoretic.   HENT:      Head: Normocephalic and atraumatic.   Eyes:      General: No scleral icterus.     Conjunctiva/sclera: Conjunctivae normal.   Cardiovascular:      Rate and Rhythm: Normal rate.   Pulmonary:      Effort: Pulmonary effort is normal. No respiratory distress.   Skin:     Coloration: Skin is not pale.   Neurological:      Mental Status: He is alert. Mental status is at baseline.   Psychiatric:         Attention and Perception: Attention and perception normal.         Mood and Affect: Mood and affect normal.         Speech: Speech normal.         Behavior: Behavior normal.         Cognition and Memory: Cognition and memory normal.         Judgment: Judgment normal.          Assessment:       1. Encounter for general adult medical examination with abnormal findings    2. Mild intermittent asthma without complication    3. Attention deficit hyperactivity disorder (ADHD), unspecified ADHD type    4. Class 1 obesity due to excess calories with body mass index (BMI) of 32.0 to 32.9 in adult, unspecified whether serious comorbidity present    5. Attention deficit hyperactivity disorder (ADHD), combined type    6. Routine screening for STI (sexually transmitted infection)        Plan:         Encounter for general adult medical examination with abnormal findings  - Risk and age appropriate anticipatory guidance.  reviewed and updated. Recommendations discussed with patient as appropriate.     Mild intermittent asthma without complication  -     BASIC METABOLIC PANEL; Future; Expected date: 06/21/2022  - Controller inhaler use daily encouraged    Attention deficit hyperactivity disorder (ADHD), unspecified ADHD type  -     BASIC METABOLIC PANEL; Future; Expected date: 06/21/2022  - Stable continue    Class 1 obesity due to excess calories with body mass index (BMI) of 32.0 to 32.9 in adult, unspecified whether serious comorbidity present    Attention deficit hyperactivity disorder (ADHD), combined type  -     lisdexamfetamine (VYVANSE) 20 MG capsule; Take 1 capsule (20 mg total) by mouth every morning.  Dispense: 90 capsule; Refill: 0    Routine screening for STI (sexually transmitted infection)  -     C. trachomatis/N. gonorrhoeae by AMP DNA; Future  -     HIV 1/2 Ag/Ab (4th Gen); Future; Expected date: 06/21/2022  -     RPR; Future; Expected date: 06/21/2022    At end of visit - left and right mostly left lateral toe edge of 5th digit with pain, Corn more proximal as well. Steel toe boots. 6 years of recurrent pain, tries to trim, but recurs. Podiatry referral.     Patient's questions answered. Plan reviewed with patient at the end of visit. Relevant precautions to chief complaint  and reasons to seek medical care or contact the office sooner reviewed with patient.     Follow up in about 1 year (around 6/21/2023) for Annual Exam.

## 2022-06-22 LAB
ANION GAP SERPL CALC-SCNC: 7 MMOL/L (ref 8–16)
BUN SERPL-MCNC: 17 MG/DL (ref 6–20)
CALCIUM SERPL-MCNC: 9.4 MG/DL (ref 8.7–10.5)
CHLORIDE SERPL-SCNC: 105 MMOL/L (ref 95–110)
CO2 SERPL-SCNC: 28 MMOL/L (ref 23–29)
CREAT SERPL-MCNC: 1.3 MG/DL (ref 0.5–1.4)
EST. GFR  (AFRICAN AMERICAN): >60 ML/MIN/1.73 M^2
EST. GFR  (NON AFRICAN AMERICAN): >60 ML/MIN/1.73 M^2
GLUCOSE SERPL-MCNC: 79 MG/DL (ref 70–110)
HIV 1+2 AB+HIV1 P24 AG SERPL QL IA: NEGATIVE
POTASSIUM SERPL-SCNC: 4.2 MMOL/L (ref 3.5–5.1)
RPR SER QL: NORMAL
SODIUM SERPL-SCNC: 140 MMOL/L (ref 136–145)

## 2022-07-11 ENCOUNTER — OFFICE VISIT (OUTPATIENT)
Dept: ORTHOPEDICS | Facility: CLINIC | Age: 22
End: 2022-07-11
Payer: COMMERCIAL

## 2022-07-11 VITALS — BODY MASS INDEX: 32.18 KG/M2 | HEIGHT: 67 IN | WEIGHT: 205 LBS

## 2022-07-11 DIAGNOSIS — M25.532 LEFT WRIST PAIN: Primary | ICD-10-CM

## 2022-07-11 PROCEDURE — 1159F MED LIST DOCD IN RCRD: CPT | Mod: CPTII,S$GLB,, | Performed by: ORTHOPAEDIC SURGERY

## 2022-07-11 PROCEDURE — 99999 PR PBB SHADOW E&M-EST. PATIENT-LVL III: ICD-10-PCS | Mod: PBBFAC,,, | Performed by: ORTHOPAEDIC SURGERY

## 2022-07-11 PROCEDURE — 1159F PR MEDICATION LIST DOCUMENTED IN MEDICAL RECORD: ICD-10-PCS | Mod: CPTII,S$GLB,, | Performed by: ORTHOPAEDIC SURGERY

## 2022-07-11 PROCEDURE — 99213 PR OFFICE/OUTPT VISIT, EST, LEVL III, 20-29 MIN: ICD-10-PCS | Mod: S$GLB,,, | Performed by: ORTHOPAEDIC SURGERY

## 2022-07-11 PROCEDURE — 99213 OFFICE O/P EST LOW 20 MIN: CPT | Mod: S$GLB,,, | Performed by: ORTHOPAEDIC SURGERY

## 2022-07-11 PROCEDURE — 3008F PR BODY MASS INDEX (BMI) DOCUMENTED: ICD-10-PCS | Mod: CPTII,S$GLB,, | Performed by: ORTHOPAEDIC SURGERY

## 2022-07-11 PROCEDURE — 3008F BODY MASS INDEX DOCD: CPT | Mod: CPTII,S$GLB,, | Performed by: ORTHOPAEDIC SURGERY

## 2022-07-11 PROCEDURE — 99999 PR PBB SHADOW E&M-EST. PATIENT-LVL III: CPT | Mod: PBBFAC,,, | Performed by: ORTHOPAEDIC SURGERY

## 2022-07-11 NOTE — PROGRESS NOTES
"Subjective:      Patient ID: Liyah Fowler is a 21 y.o. male.  Chief Complaint: Follow-up of the Left Wrist      HPI  Liyah Fowler is a  21 y.o. male presenting today for follow up of his pain.  He reports that he is much improved after the previous No further pain in the wrist  He does wear the wrist brace at work for heavy lifting which helps out  No numbness or tingling reported  .    Review of patient's allergies indicates:  No Known Allergies      Current Outpatient Medications   Medication Sig Dispense Refill    albuterol (VENTOLIN HFA) 90 mcg/actuation inhaler Inhale 2 puffs into the lungs every 6 (six) hours as needed for Wheezing or Shortness of Breath. Rescue 18 g 2    fluticasone propionate (FLOVENT HFA) 110 mcg/actuation inhaler Inhale 2 puffs into the lungs every 12 (twelve) hours. Controller 12 g 11    ibuprofen (ADVIL,MOTRIN) 800 MG tablet Take 1 tablet (800 mg total) by mouth 3 (three) times daily as needed for Pain. 90 tablet 0    lisdexamfetamine (VYVANSE) 20 MG capsule Take 1 capsule (20 mg total) by mouth every morning. 90 capsule 0     No current facility-administered medications for this visit.       Past Medical History:   Diagnosis Date    ADHD (attention deficit hyperactivity disorder)     Asthma, currently dormant        No past surgical history on file.    OBJECTIVE:   PHYSICAL EXAM:  Height: 5' 7" (170.2 cm) Weight: 93 kg (205 lb)  Vitals:    07/11/22 0930   Weight: 93 kg (205 lb)   Height: 5' 7" (1.702 m)   PainSc: 0-No pain     Ortho/SPM Exam  Examination left wrist no t  enderness no swelling  Range of motion full but he does have little pain on extreme flexion and extension  Range of motion fingers full  Tinel sign negative   strength intact      RADIOGRAPHS:  None  Comments: I have personally reviewed the imaging and I agree with the above radiologist's report.    ASSESSMENT/PLAN:     IMPRESSION:  Left wrist pain resolved    PLAN:  I recommended that he use the wrist " brace for heavy lifting  Advil or Motrin by mouth as needed  Keep an eye on symptoms    FOLLOW UP:  If symptoms recur    Disclaimer: This note has been generated using voice-recognition software. There may be typographical errors that have been missed during proof-reading.

## 2022-07-27 ENCOUNTER — HOSPITAL ENCOUNTER (OUTPATIENT)
Dept: RADIOLOGY | Facility: HOSPITAL | Age: 22
Discharge: HOME | End: 2022-07-27
Attending: STUDENT IN AN ORGANIZED HEALTH CARE EDUCATION/TRAINING PROGRAM
Payer: COMMERCIAL

## 2022-07-27 ENCOUNTER — OFFICE VISIT (OUTPATIENT)
Dept: PODIATRY | Facility: CLINIC | Age: 22
End: 2022-07-27
Payer: COMMERCIAL

## 2022-07-27 VITALS
DIASTOLIC BLOOD PRESSURE: 76 MMHG | BODY MASS INDEX: 32.11 KG/M2 | HEIGHT: 67 IN | HEART RATE: 65 BPM | SYSTOLIC BLOOD PRESSURE: 126 MMHG

## 2022-07-27 DIAGNOSIS — Q66.89 CURLY TOE: Primary | ICD-10-CM

## 2022-07-27 DIAGNOSIS — L60.0 INGROWN NAIL OF FIFTH TOE OF LEFT FOOT: ICD-10-CM

## 2022-07-27 DIAGNOSIS — L84 CORN OR CALLUS: ICD-10-CM

## 2022-07-27 DIAGNOSIS — Q66.89 CURLY TOE: ICD-10-CM

## 2022-07-27 PROCEDURE — 3074F SYST BP LT 130 MM HG: CPT | Mod: CPTII,S$GLB,, | Performed by: STUDENT IN AN ORGANIZED HEALTH CARE EDUCATION/TRAINING PROGRAM

## 2022-07-27 PROCEDURE — 99999 PR PBB SHADOW E&M-EST. PATIENT-LVL III: ICD-10-PCS | Mod: PBBFAC,,, | Performed by: STUDENT IN AN ORGANIZED HEALTH CARE EDUCATION/TRAINING PROGRAM

## 2022-07-27 PROCEDURE — 3074F PR MOST RECENT SYSTOLIC BLOOD PRESSURE < 130 MM HG: ICD-10-PCS | Mod: CPTII,S$GLB,, | Performed by: STUDENT IN AN ORGANIZED HEALTH CARE EDUCATION/TRAINING PROGRAM

## 2022-07-27 PROCEDURE — 1159F MED LIST DOCD IN RCRD: CPT | Mod: CPTII,S$GLB,, | Performed by: STUDENT IN AN ORGANIZED HEALTH CARE EDUCATION/TRAINING PROGRAM

## 2022-07-27 PROCEDURE — 1159F PR MEDICATION LIST DOCUMENTED IN MEDICAL RECORD: ICD-10-PCS | Mod: CPTII,S$GLB,, | Performed by: STUDENT IN AN ORGANIZED HEALTH CARE EDUCATION/TRAINING PROGRAM

## 2022-07-27 PROCEDURE — 99203 OFFICE O/P NEW LOW 30 MIN: CPT | Mod: S$GLB,,, | Performed by: STUDENT IN AN ORGANIZED HEALTH CARE EDUCATION/TRAINING PROGRAM

## 2022-07-27 PROCEDURE — 73630 X-RAY EXAM OF FOOT: CPT | Mod: 26,,, | Performed by: RADIOLOGY

## 2022-07-27 PROCEDURE — 73630 XR FOOT COMPLETE 3 VIEW BILATERAL: ICD-10-PCS | Mod: 26,,, | Performed by: RADIOLOGY

## 2022-07-27 PROCEDURE — 3008F PR BODY MASS INDEX (BMI) DOCUMENTED: ICD-10-PCS | Mod: CPTII,S$GLB,, | Performed by: STUDENT IN AN ORGANIZED HEALTH CARE EDUCATION/TRAINING PROGRAM

## 2022-07-27 PROCEDURE — 3008F BODY MASS INDEX DOCD: CPT | Mod: CPTII,S$GLB,, | Performed by: STUDENT IN AN ORGANIZED HEALTH CARE EDUCATION/TRAINING PROGRAM

## 2022-07-27 PROCEDURE — 73630 X-RAY EXAM OF FOOT: CPT | Mod: TC,50,PN

## 2022-07-27 PROCEDURE — 99203 PR OFFICE/OUTPT VISIT, NEW, LEVL III, 30-44 MIN: ICD-10-PCS | Mod: S$GLB,,, | Performed by: STUDENT IN AN ORGANIZED HEALTH CARE EDUCATION/TRAINING PROGRAM

## 2022-07-27 PROCEDURE — 99999 PR PBB SHADOW E&M-EST. PATIENT-LVL III: CPT | Mod: PBBFAC,,, | Performed by: STUDENT IN AN ORGANIZED HEALTH CARE EDUCATION/TRAINING PROGRAM

## 2022-07-27 PROCEDURE — 3078F PR MOST RECENT DIASTOLIC BLOOD PRESSURE < 80 MM HG: ICD-10-PCS | Mod: CPTII,S$GLB,, | Performed by: STUDENT IN AN ORGANIZED HEALTH CARE EDUCATION/TRAINING PROGRAM

## 2022-07-27 PROCEDURE — 3078F DIAST BP <80 MM HG: CPT | Mod: CPTII,S$GLB,, | Performed by: STUDENT IN AN ORGANIZED HEALTH CARE EDUCATION/TRAINING PROGRAM

## 2022-07-27 RX ORDER — UREA 40 %
CREAM (GRAM) TOPICAL 2 TIMES DAILY
Qty: 120 G | Refills: 5 | Status: SHIPPED | OUTPATIENT
Start: 2022-07-27 | End: 2022-07-27

## 2022-07-27 NOTE — PROGRESS NOTES
Subjective:      Patient ID: Liyah Fowler is a 21 y.o. male.    Chief Complaint: Ingrown Toenail     Liyah is a 21 y.o. male who presents to the podiatry clinic  with complaint of  bilateral foot pain. Onset of the symptoms was several months ago. Precipitating event: none known. Current symptoms include: ability to bear weight, but with some pain. Aggravating factors: any weight bearing. Symptoms have stabilized. Patient has had no prior foot problems. Evaluation to date: none. Treatment to date: none. Patients rates pain 10/10 on pain scale.        Review of Systems   Constitutional: Negative for chills, decreased appetite, diaphoresis and fever.   HENT: Negative for congestion and hearing loss.    Cardiovascular: Negative for chest pain, claudication, leg swelling and syncope.   Respiratory: Negative for cough and shortness of breath.    Skin: Positive for dry skin and nail changes. Negative for color change, flushing, itching, poor wound healing and rash.   Musculoskeletal: Negative for arthritis, back pain, joint pain and joint swelling.   Gastrointestinal: Negative for nausea and vomiting.   Neurological: Negative for focal weakness, numbness, paresthesias and weakness.   Psychiatric/Behavioral: Negative for altered mental status. The patient is not nervous/anxious.            Objective:      Physical Exam  Constitutional:       General: He is not in acute distress.     Appearance: Normal appearance. He is well-developed. He is not diaphoretic.   Cardiovascular:      Comments: Dorsalis pedis and posterior tibial pulses are within normal limits. Skin temperature is within normal limits. Toes are cool to touch and feet are warm proximally. Hair growth is within normal limits. Skin is normotrophic and without hyperpigmentation. No edema noted. No varicosities or spider veins noted, bilaterally.     Musculoskeletal:         General: Tenderness present.      Comments: Adequate joint range of motion without  pain, limitation, nor crepitation to foot and ankle joints. Muscle strength is 5/5 in all groups bilaterally.    Pes planus foot type, bilaterally, with mild HAV deformity noted and semi reducible hammertoes 2-5, bilaterally. Equinovarus deformity noted to bilateral 5th digit with tenderness to lateral border of 5th digit toenail and adjacent hyperkeratosis noted, tenderness on palpation   Feet:      Right foot:      Skin integrity: Dry skin present. No ulcer, blister, erythema or warmth.      Left foot:      Skin integrity: Dry skin present. No ulcer, blister, erythema or warmth.   Skin:     General: Skin is warm and dry.      Capillary Refill: Capillary refill takes less than 2 seconds.      Comments: Skin is warm and dry, no acute signs of infection noted bilaterally      Toenails are well trimmed and of normal morphology    Otherwise, no open wounds, macerations or hyperkeratotic lesions, bilaterally            Neurological:      Mental Status: He is alert and oriented to person, place, and time.      Sensory: No sensory deficit.      Motor: No abnormal muscle tone.      Comments: Light touch within normal limits.    Psychiatric:         Mood and Affect: Mood normal.         Behavior: Behavior normal.         Thought Content: Thought content normal.               Assessment:       Encounter Diagnoses   Name Primary?    Ingrown nail of fifth toe of left foot     Curly toe Yes    Corn or callus          Plan:       Liyah was seen today for ingrown toenail.    Diagnoses and all orders for this visit:    Curly toe  -     X-Ray Foot Complete Bilateral; Future    Ingrown nail of fifth toe of left foot  -     Ambulatory referral/consult to Podiatry  -     X-Ray Foot Complete Bilateral; Future    Corn or callus  -     X-Ray Foot Complete Bilateral; Future    Other orders  -     urea (CARMOL) 40 % Crea; Apply topically 2 (two) times daily.      I counseled the patient on his conditions, their implications and medical  management.    Xray ordered, discussed painful ingrown toenail is likely secondary to hammertoe deformity. Discussed surgical vs conservative treatment. Patient wishes to continue with conservative treatment for now.     Utilizing sterile toenail clippers I aggressively debrided the offending nail border approximately 3 mm from its edge and carried the nail plate incision down at an angle in order to wedge out the offending cryptotic portion of the nail plate. The offending border was then removed in toto. The area was cleansed with alcohol. Patient tolerated the procedure well. Recommend keep sites protected with bandage. Discussed will consider total nail avulsion during upcoming visit    Recommend urea 40% cream to soften adjacent callus and toenail (found on amazon.com). Consider toe sleeves as well, dispensed in clinic today, can be found over the counter. Recommend open toed shoes or shoes with wide toe box to reduce pressure.     RTC in 2-4 weeks, sooner PRN

## 2022-08-11 ENCOUNTER — OFFICE VISIT (OUTPATIENT)
Dept: PODIATRY | Facility: CLINIC | Age: 22
End: 2022-08-11
Payer: COMMERCIAL

## 2022-08-11 VITALS — BODY MASS INDEX: 32.11 KG/M2 | HEIGHT: 67 IN

## 2022-08-11 DIAGNOSIS — Q66.89 CURLY TOE: ICD-10-CM

## 2022-08-11 DIAGNOSIS — M21.40 PES PLANUS, UNSPECIFIED LATERALITY: ICD-10-CM

## 2022-08-11 DIAGNOSIS — L60.0 INGROWN NAIL OF FIFTH TOE OF LEFT FOOT: Primary | ICD-10-CM

## 2022-08-11 DIAGNOSIS — M20.40 HAMMER TOE, UNSPECIFIED LATERALITY: ICD-10-CM

## 2022-08-11 DIAGNOSIS — L84 CORN OR CALLUS: ICD-10-CM

## 2022-08-11 PROCEDURE — 99214 PR OFFICE/OUTPT VISIT, EST, LEVL IV, 30-39 MIN: ICD-10-PCS | Mod: 25,S$GLB,, | Performed by: STUDENT IN AN ORGANIZED HEALTH CARE EDUCATION/TRAINING PROGRAM

## 2022-08-11 PROCEDURE — 11730 NAIL REMOVAL: ICD-10-PCS | Mod: T4,S$GLB,, | Performed by: STUDENT IN AN ORGANIZED HEALTH CARE EDUCATION/TRAINING PROGRAM

## 2022-08-11 PROCEDURE — 99214 OFFICE O/P EST MOD 30 MIN: CPT | Mod: 25,S$GLB,, | Performed by: STUDENT IN AN ORGANIZED HEALTH CARE EDUCATION/TRAINING PROGRAM

## 2022-08-11 PROCEDURE — 99999 PR PBB SHADOW E&M-EST. PATIENT-LVL II: CPT | Mod: PBBFAC,,, | Performed by: STUDENT IN AN ORGANIZED HEALTH CARE EDUCATION/TRAINING PROGRAM

## 2022-08-11 PROCEDURE — 3008F PR BODY MASS INDEX (BMI) DOCUMENTED: ICD-10-PCS | Mod: CPTII,S$GLB,, | Performed by: STUDENT IN AN ORGANIZED HEALTH CARE EDUCATION/TRAINING PROGRAM

## 2022-08-11 PROCEDURE — 11730 AVULSION NAIL PLATE SIMPLE 1: CPT | Mod: T4,S$GLB,, | Performed by: STUDENT IN AN ORGANIZED HEALTH CARE EDUCATION/TRAINING PROGRAM

## 2022-08-11 PROCEDURE — 1159F MED LIST DOCD IN RCRD: CPT | Mod: CPTII,S$GLB,, | Performed by: STUDENT IN AN ORGANIZED HEALTH CARE EDUCATION/TRAINING PROGRAM

## 2022-08-11 PROCEDURE — 99999 PR PBB SHADOW E&M-EST. PATIENT-LVL II: ICD-10-PCS | Mod: PBBFAC,,, | Performed by: STUDENT IN AN ORGANIZED HEALTH CARE EDUCATION/TRAINING PROGRAM

## 2022-08-11 PROCEDURE — 1159F PR MEDICATION LIST DOCUMENTED IN MEDICAL RECORD: ICD-10-PCS | Mod: CPTII,S$GLB,, | Performed by: STUDENT IN AN ORGANIZED HEALTH CARE EDUCATION/TRAINING PROGRAM

## 2022-08-11 PROCEDURE — 3008F BODY MASS INDEX DOCD: CPT | Mod: CPTII,S$GLB,, | Performed by: STUDENT IN AN ORGANIZED HEALTH CARE EDUCATION/TRAINING PROGRAM

## 2022-08-11 NOTE — LETTER
August 11, 2022      [facility]  [address] [place] [address], [address]  [place] [address]  Phone: [phone]  Fax: [phone]       Patient: Liayh Fowler   YOB: 2000  Date of Visit: 08/11/2022    To Whom It May Concern:    Seven Fowler  was at [facility] on 08/11/2022. The patient may return to work on 8/12/22 with no restrictions. If you have any questions or concerns, or if I can be of further assistance, please do not hesitate to contact me.    Sincerely,    Mia Damon CO

## 2022-08-11 NOTE — PROCEDURES
Nail Removal    Date/Time: 8/11/2022 9:30 AM  Performed by: Radha Duckworth DPM  Authorized by: Radha Duckworth DPM     Consent Done?:  Yes (Written)  Location:     Location:  Left foot    Location detail:  Left little toe  Anesthesia:     Anesthesia:  Digital block    Local anesthetic:  Lidocaine 2% without epinephrine    Anesthetic total (ml):  4  Procedure Details:     Preparation:  Skin prepped with alcohol and skin prepped with Betadine    Amount removed:  Complete    Wedge excision of skin of nail fold: No      Nail bed sutured?: No      Nail matrix removed:  None    Dressing applied:  4x4, antibiotic ointment and Xeroform gauze    Patient tolerance:  Patient tolerated the procedure well with no immediate complications     Following written and verbal consent, a timeout was performed confirming the patient's identification, procedure, surgical site, medications and allergies. All were in agreement.

## 2022-08-11 NOTE — PROGRESS NOTES
Subjective:      Patient ID: Liyah Fowler is a 21 y.o. male.    Chief Complaint: Ingrown Toenail     Chief Complaint   Patient presents with    Ingrown Toenail     Left great toenail         Liyah is a 21 y.o. male who presents to the podiatry clinic  with complaint of  bilateral foot pain. Onset of the symptoms was several months ago. Precipitating event: none known. Current symptoms include: ability to bear weight, but with some pain. Aggravating factors: any weight bearing. Symptoms have stabilized. Patient has had no prior foot problems. Evaluation to date: none. Treatment to date: none. Patients rates pain 10/10 on pain scale.    8/11/22: Pt seen today for left foot, 5th digit ingrown toenail removal and for xray review. No new pedal complaints.        Review of Systems   Constitutional: Negative for chills, decreased appetite, diaphoresis and fever.   HENT: Negative for congestion and hearing loss.    Cardiovascular: Negative for chest pain, claudication, leg swelling and syncope.   Respiratory: Negative for cough and shortness of breath.    Skin: Positive for dry skin and nail changes. Negative for color change, flushing, itching, poor wound healing and rash.   Musculoskeletal: Negative for arthritis, back pain, joint pain and joint swelling.   Gastrointestinal: Negative for nausea and vomiting.   Neurological: Negative for focal weakness, numbness, paresthesias and weakness.   Psychiatric/Behavioral: Negative for altered mental status. The patient is not nervous/anxious.            Objective:      Physical Exam  Constitutional:       General: He is not in acute distress.     Appearance: Normal appearance. He is well-developed. He is not diaphoretic.   Cardiovascular:      Comments: Dorsalis pedis and posterior tibial pulses are within normal limits. Skin temperature is within normal limits. Toes are cool to touch and feet are warm proximally. Hair growth is within normal limits. Skin is normotrophic and  without hyperpigmentation. No edema noted. No varicosities or spider veins noted, bilaterally.     Musculoskeletal:         General: Tenderness present.      Comments: Adequate joint range of motion without pain, limitation, nor crepitation to foot and ankle joints. Muscle strength is 5/5 in all groups bilaterally.    Pes planus foot type, bilaterally, with mild HAV deformity noted and semi reducible hammertoes 2-5, bilaterally. Equinovarus deformity noted to bilateral 5th digit with tenderness to lateral border of 5th digit toenail and adjacent hyperkeratosis noted, tenderness on palpation   Feet:      Right foot:      Skin integrity: Dry skin present. No ulcer, blister, erythema or warmth.      Left foot:      Skin integrity: Dry skin present. No ulcer, blister, erythema or warmth.   Skin:     General: Skin is warm and dry.      Capillary Refill: Capillary refill takes less than 2 seconds.      Comments: Skin is warm and dry, no acute signs of infection noted bilaterally      Toenails are well trimmed and of normal morphology    Otherwise, no open wounds, macerations or hyperkeratotic lesions, bilaterally            Neurological:      Mental Status: He is alert and oriented to person, place, and time.      Sensory: No sensory deficit.      Motor: No abnormal muscle tone.      Comments: Light touch within normal limits.    Psychiatric:         Mood and Affect: Mood normal.         Behavior: Behavior normal.         Thought Content: Thought content normal.               Assessment:       Encounter Diagnoses   Name Primary?    Ingrown nail of fifth toe of left foot Yes    Curly toe     Corn or callus     Pes planus, unspecified laterality     Hammer toe, unspecified laterality          Plan:       Liyah was seen today for ingrown toenail.    Diagnoses and all orders for this visit:    Ingrown nail of fifth toe of left foot  -     Nail Removal    Curly toe  -     Nail Removal    Corn or callus  -     Nail  Removal    Pes planus, unspecified laterality  -     Nail Removal    Hammer toe, unspecified laterality  -     Nail Removal      I counseled the patient on his conditions, their implications and medical management.    Xray reviewed, discussed painful ingrown toenail is likely secondary to hammertoe deformity. Discussed surgical vs conservative treatment. Patient wishes to continue with conservative treatment for now.     Per patient request, left 5th digit nail was removed, see procedure note, site was dressed with triple antibiotic ointment and bandage. He is to change same 2-3x per day until healed.    After healing, recommend urea 40% cream to soften adjacent callus and toenail (found on [employer]). Consider toe sleeves as well, dispensed in clinic today, can be found over the counter. Recommend open toed shoes or shoes with wide toe box to reduce pressure.     RTC in 2-4 weeks, sooner PRN

## 2022-09-29 ENCOUNTER — OFFICE VISIT (OUTPATIENT)
Dept: PODIATRY | Facility: CLINIC | Age: 22
End: 2022-09-29
Payer: COMMERCIAL

## 2022-09-29 VITALS — BODY MASS INDEX: 32.11 KG/M2 | HEIGHT: 67 IN

## 2022-09-29 DIAGNOSIS — Q66.89 CURLY TOE: ICD-10-CM

## 2022-09-29 DIAGNOSIS — L84 CORN OR CALLUS: ICD-10-CM

## 2022-09-29 DIAGNOSIS — L60.0 INGROWN NAIL OF FIFTH TOE OF LEFT FOOT: Primary | ICD-10-CM

## 2022-09-29 PROCEDURE — 1159F PR MEDICATION LIST DOCUMENTED IN MEDICAL RECORD: ICD-10-PCS | Mod: CPTII,S$GLB,, | Performed by: STUDENT IN AN ORGANIZED HEALTH CARE EDUCATION/TRAINING PROGRAM

## 2022-09-29 PROCEDURE — 3008F BODY MASS INDEX DOCD: CPT | Mod: CPTII,S$GLB,, | Performed by: STUDENT IN AN ORGANIZED HEALTH CARE EDUCATION/TRAINING PROGRAM

## 2022-09-29 PROCEDURE — 1159F MED LIST DOCD IN RCRD: CPT | Mod: CPTII,S$GLB,, | Performed by: STUDENT IN AN ORGANIZED HEALTH CARE EDUCATION/TRAINING PROGRAM

## 2022-09-29 PROCEDURE — 99214 OFFICE O/P EST MOD 30 MIN: CPT | Mod: S$GLB,,, | Performed by: STUDENT IN AN ORGANIZED HEALTH CARE EDUCATION/TRAINING PROGRAM

## 2022-09-29 PROCEDURE — 99999 PR PBB SHADOW E&M-EST. PATIENT-LVL II: CPT | Mod: PBBFAC,,, | Performed by: STUDENT IN AN ORGANIZED HEALTH CARE EDUCATION/TRAINING PROGRAM

## 2022-09-29 PROCEDURE — 3008F PR BODY MASS INDEX (BMI) DOCUMENTED: ICD-10-PCS | Mod: CPTII,S$GLB,, | Performed by: STUDENT IN AN ORGANIZED HEALTH CARE EDUCATION/TRAINING PROGRAM

## 2022-09-29 PROCEDURE — 99214 PR OFFICE/OUTPT VISIT, EST, LEVL IV, 30-39 MIN: ICD-10-PCS | Mod: S$GLB,,, | Performed by: STUDENT IN AN ORGANIZED HEALTH CARE EDUCATION/TRAINING PROGRAM

## 2022-09-29 PROCEDURE — 99999 PR PBB SHADOW E&M-EST. PATIENT-LVL II: ICD-10-PCS | Mod: PBBFAC,,, | Performed by: STUDENT IN AN ORGANIZED HEALTH CARE EDUCATION/TRAINING PROGRAM

## 2022-09-29 NOTE — PROGRESS NOTES
Subjective:      Patient ID: Liyah Fowler is a 22 y.o. male.    Chief Complaint: Ingrown Toenail     Chief Complaint   Patient presents with    Nail Problem     Left 5th toe, still painful         Liyah is a 22 y.o. male who presents to the podiatry clinic  with complaint of  bilateral foot pain. Onset of the symptoms was several months ago. Precipitating event: none known. Current symptoms include: ability to bear weight, but with some pain. Aggravating factors: any weight bearing. Symptoms have stabilized. Patient has had no prior foot problems. Evaluation to date: none. Treatment to date: none. Patients rates pain 10/10 on pain scale.    8/11/22: Pt seen today for left foot, 5th digit ingrown toenail removal and for xray review. No new pedal complaints.    9/29/22: Pt seen today for left 5th toe callus. States pain is improved to 2/10 but feels pain is slowly coming back. He is inquiring about surgical options. Relates has has not been using toe pad and has not bought urea cream yet. No other pedal complaints.         Review of Systems   Constitutional: Negative for chills, decreased appetite, diaphoresis and fever.   HENT:  Negative for congestion and hearing loss.    Cardiovascular:  Negative for chest pain, claudication, leg swelling and syncope.   Respiratory:  Negative for cough and shortness of breath.    Skin:  Positive for dry skin and nail changes. Negative for color change, flushing, itching, poor wound healing and rash.   Musculoskeletal:  Negative for arthritis, back pain, joint pain and joint swelling.   Gastrointestinal:  Negative for nausea and vomiting.   Neurological:  Negative for focal weakness, numbness, paresthesias and weakness.   Psychiatric/Behavioral:  Negative for altered mental status. The patient is not nervous/anxious.          Objective:      Physical Exam  Constitutional:       General: He is not in acute distress.     Appearance: Normal appearance. He is well-developed. He is  not diaphoretic.   Cardiovascular:      Comments: Dorsalis pedis and posterior tibial pulses are within normal limits. Skin temperature is within normal limits. Toes are cool to touch and feet are warm proximally. Hair growth is within normal limits. Skin is normotrophic and without hyperpigmentation. No edema noted. No varicosities or spider veins noted, bilaterally.     Musculoskeletal:         General: Tenderness present.      Comments: Adequate joint range of motion without pain, limitation, nor crepitation to foot and ankle joints. Muscle strength is 5/5 in all groups bilaterally.    Pes planus foot type, bilaterally, with mild HAV deformity noted and semi reducible hammertoes 2-5, bilaterally.     Equinovarus deformity noted to bilateral 5th digit with tenderness to plantar lateral callus and adjacent lateral border of 5th digit toenail    Feet:      Right foot:      Skin integrity: Dry skin present. No ulcer, blister, erythema or warmth.      Left foot:      Skin integrity: Dry skin present. No ulcer, blister, erythema or warmth.   Skin:     General: Skin is warm and dry.      Capillary Refill: Capillary refill takes less than 2 seconds.      Comments: Skin is warm and dry, no acute signs of infection noted bilaterally      Toenails are well trimmed and of normal morphology    Otherwise, no open wounds, macerations or hyperkeratotic lesions, bilaterally            Neurological:      Mental Status: He is alert and oriented to person, place, and time.      Sensory: No sensory deficit.      Motor: No abnormal muscle tone.      Comments: Light touch within normal limits.    Psychiatric:         Mood and Affect: Mood normal.         Behavior: Behavior normal.         Thought Content: Thought content normal.             Assessment:       Encounter Diagnoses   Name Primary?    Ingrown nail of fifth toe of left foot Yes    Curly toe     Corn or callus          Plan:       Liyah was seen today for nail  problem.    Diagnoses and all orders for this visit:    Ingrown nail of fifth toe of left foot    Curly toe    Corn or callus    I counseled the patient on his conditions, their implications and medical management.    Xray reviewed, discussed painful callus and ingrown toenail is likely secondary to hammertoe deformity. Discussed surgical vs conservative treatment. Patient wishes to continue with conservative treatment for now. States will consider surgical correction in the future.    Callus debrided using 15 blade today as a courtesy without incident, patient tolerated procedure well. Recommend urea 40% cream to soften adjacent callus and toenail (found on [employer]). Consider toe sleeves as well, dispensed in clinic today, can be found over the counter. Recommend open toed shoes or shoes with wide toe box to reduce pressure.     RTC PRN

## 2022-10-04 ENCOUNTER — OFFICE VISIT (OUTPATIENT)
Dept: INTERNAL MEDICINE | Facility: CLINIC | Age: 22
End: 2022-10-04
Payer: COMMERCIAL

## 2022-10-04 ENCOUNTER — PATIENT MESSAGE (OUTPATIENT)
Dept: INTERNAL MEDICINE | Facility: CLINIC | Age: 22
End: 2022-10-04

## 2022-10-04 DIAGNOSIS — F90.2 ATTENTION DEFICIT HYPERACTIVITY DISORDER (ADHD), COMBINED TYPE: ICD-10-CM

## 2022-10-04 PROCEDURE — 1160F PR REVIEW ALL MEDS BY PRESCRIBER/CLIN PHARMACIST DOCUMENTED: ICD-10-PCS | Mod: CPTII,95,, | Performed by: INTERNAL MEDICINE

## 2022-10-04 PROCEDURE — 99213 PR OFFICE/OUTPT VISIT, EST, LEVL III, 20-29 MIN: ICD-10-PCS | Mod: 95,,, | Performed by: INTERNAL MEDICINE

## 2022-10-04 PROCEDURE — 1159F PR MEDICATION LIST DOCUMENTED IN MEDICAL RECORD: ICD-10-PCS | Mod: CPTII,95,, | Performed by: INTERNAL MEDICINE

## 2022-10-04 PROCEDURE — 99213 OFFICE O/P EST LOW 20 MIN: CPT | Mod: 95,,, | Performed by: INTERNAL MEDICINE

## 2022-10-04 PROCEDURE — 1160F RVW MEDS BY RX/DR IN RCRD: CPT | Mod: CPTII,95,, | Performed by: INTERNAL MEDICINE

## 2022-10-04 PROCEDURE — 1159F MED LIST DOCD IN RCRD: CPT | Mod: CPTII,95,, | Performed by: INTERNAL MEDICINE

## 2022-10-04 RX ORDER — LISDEXAMFETAMINE DIMESYLATE CAPSULES 20 MG/1
20 CAPSULE ORAL EVERY MORNING
Qty: 90 CAPSULE | Refills: 0 | Status: SHIPPED | OUTPATIENT
Start: 2022-10-04 | End: 2023-01-02

## 2022-10-04 NOTE — PROGRESS NOTES
Assessment:       1. Attention deficit hyperactivity disorder (ADHD), combined type          Plan:         Diagnoses and all orders for this visit:    Attention deficit hyperactivity disorder (ADHD), combined type  -     lisdexamfetamine (VYVANSE) 20 MG capsule; Take 1 capsule (20 mg total) by mouth every morning.    Chronic  Controlled  Patient is at goal today   I have reviewed lifestyle modification to achieve/maintain goals  We will continue the current medication regimen as listed below  Patient will follow up in 3 months         Subjective:       Patient ID: Liyah Fowler is a 22 y.o. male.    Chief Complaint: No chief complaint on file.          ADHD  Prior Documented Dx: yes  PCP/Medication:  Trina Bowen MD  Current Outpatient Medications   Medication Sig Dispense Refill    albuterol (VENTOLIN HFA) 90 mcg/actuation inhaler Inhale 2 puffs into the lungs every 6 (six) hours as needed for Wheezing or Shortness of Breath. Rescue 18 g 2    fluticasone propionate (FLOVENT HFA) 110 mcg/actuation inhaler Inhale 2 puffs into the lungs every 12 (twelve) hours. Controller 12 g 11    ibuprofen (ADVIL,MOTRIN) 800 MG tablet Take 1 tablet (800 mg total) by mouth 3 (three) times daily as needed for Pain. 90 tablet 0    lisdexamfetamine (VYVANSE) 20 MG capsule Take 1 capsule (20 mg total) by mouth every morning. 90 capsule 0    urea (CARMOL) 40 % Crea APPLY TOPICALLY TWICE A .4 g 5     No current facility-administered medications for this visit.        problems with moodiness, appetite, weight loss, or sleep? no     complaints  about taking the medications? no     he last examined for ADHD? 3 months  ago     When is his next appointment due? In 3 months     Illicit substance no     Bought are sold from different pharmacy or non-pharmacy location no    [place]    HPI  Review of Systems   All other systems reviewed and are negative.        Objective:     There were no vitals taken for this visit.      Wt  Readings from Last 1 Encounters:   07/11/22 0930 93 kg (205 lb)       BMI Readings from Last 1 Encounters:   09/29/22 32.11 kg/m²            Physical Exam  Nursing note reviewed.   Constitutional:       General: He is not in acute distress.     Appearance: Normal appearance. He is not ill-appearing, toxic-appearing or diaphoretic.   HENT:      Head: Normocephalic.   Eyes:      Conjunctiva/sclera: Conjunctivae normal.   Pulmonary:      Effort: Pulmonary effort is normal. No respiratory distress.   Neurological:      General: No focal deficit present.      Mental Status: He is alert and oriented to person, place, and time.   Psychiatric:         Mood and Affect: Mood normal.         Behavior: Behavior normal.         Thought Content: Thought content normal.         Judgment: Judgment normal.             Future Appointments   Date Time Provider Department Center   6/26/2023  1:40 PM Trina Bowen MD [facility]         Medication List with Changes/Refills   Current Medications    ALBUTEROL (VENTOLIN HFA) 90 MCG/ACTUATION INHALER    Inhale 2 puffs into the lungs every 6 (six) hours as needed for Wheezing or Shortness of Breath. Rescue    FLUTICASONE PROPIONATE (FLOVENT HFA) 110 MCG/ACTUATION INHALER    Inhale 2 puffs into the lungs every 12 (twelve) hours. Controller    IBUPROFEN (ADVIL,MOTRIN) 800 MG TABLET    Take 1 tablet (800 mg total) by mouth 3 (three) times daily as needed for Pain.    UREA (CARMOL) 40 % CREA    APPLY TOPICALLY TWICE A DAY   Changed and/or Refilled Medications    Modified Medication Previous Medication    LISDEXAMFETAMINE (VYVANSE) 20 MG CAPSULE lisdexamfetamine (VYVANSE) 20 MG capsule       Take 1 capsule (20 mg total) by mouth every morning.    Take 1 capsule (20 mg total) by mouth every morning.       The patient location is: [place] [facility]   The chief complaint leading to consultation is: adhd     Visit type: audiovisual    Face to Face time with patient: 20   minutes of total time spent  on the encounter, which includes face to face time and non-face to face time preparing to see the patient (eg, review of tests), Obtaining and/or reviewing separately obtained history, Documenting clinical information in the electronic or other health record, Independently interpreting results (not separately reported) and communicating results to the patient/family/caregiver, or Care coordination (not separately reported).         Each patient to whom he or she provides medical services by telemedicine is:  (1) informed of the relationship between the physician and patient and the respective role of any other health care provider with respect to management of the patient; and (2) notified that he or she may decline to receive medical services by telemedicine and may withdraw from such care at any time.    Notes:       Disclaimer:  This note has been generated using voice-recognition software. There may be grammatical or spelling errors that have been missed during proof-reading

## 2023-03-03 ENCOUNTER — OFFICE VISIT (OUTPATIENT)
Dept: URGENT CARE | Facility: CLINIC | Age: 23
End: 2023-03-03
Payer: COMMERCIAL

## 2023-03-03 VITALS
OXYGEN SATURATION: 97 % | BODY MASS INDEX: 32.18 KG/M2 | WEIGHT: 205 LBS | SYSTOLIC BLOOD PRESSURE: 118 MMHG | HEART RATE: 68 BPM | RESPIRATION RATE: 20 BRPM | HEIGHT: 67 IN | TEMPERATURE: 98 F | DIASTOLIC BLOOD PRESSURE: 75 MMHG

## 2023-03-03 DIAGNOSIS — M67.442 GANGLION CYST OF FINGER OF LEFT HAND: ICD-10-CM

## 2023-03-03 DIAGNOSIS — L03.114 CELLULITIS OF LEFT UPPER EXTREMITY: Primary | ICD-10-CM

## 2023-03-03 PROCEDURE — 3074F SYST BP LT 130 MM HG: CPT | Mod: CPTII,S$GLB,,

## 2023-03-03 PROCEDURE — 3078F DIAST BP <80 MM HG: CPT | Mod: CPTII,S$GLB,,

## 2023-03-03 PROCEDURE — 1159F MED LIST DOCD IN RCRD: CPT | Mod: CPTII,S$GLB,,

## 2023-03-03 PROCEDURE — 99213 PR OFFICE/OUTPT VISIT, EST, LEVL III, 20-29 MIN: ICD-10-PCS | Mod: S$GLB,,,

## 2023-03-03 PROCEDURE — 3078F PR MOST RECENT DIASTOLIC BLOOD PRESSURE < 80 MM HG: ICD-10-PCS | Mod: CPTII,S$GLB,,

## 2023-03-03 PROCEDURE — 3008F PR BODY MASS INDEX (BMI) DOCUMENTED: ICD-10-PCS | Mod: CPTII,S$GLB,,

## 2023-03-03 PROCEDURE — 1160F RVW MEDS BY RX/DR IN RCRD: CPT | Mod: CPTII,S$GLB,,

## 2023-03-03 PROCEDURE — 99213 OFFICE O/P EST LOW 20 MIN: CPT | Mod: S$GLB,,,

## 2023-03-03 PROCEDURE — 3008F BODY MASS INDEX DOCD: CPT | Mod: CPTII,S$GLB,,

## 2023-03-03 PROCEDURE — 1160F PR REVIEW ALL MEDS BY PRESCRIBER/CLIN PHARMACIST DOCUMENTED: ICD-10-PCS | Mod: CPTII,S$GLB,,

## 2023-03-03 PROCEDURE — 3074F PR MOST RECENT SYSTOLIC BLOOD PRESSURE < 130 MM HG: ICD-10-PCS | Mod: CPTII,S$GLB,,

## 2023-03-03 PROCEDURE — 1159F PR MEDICATION LIST DOCUMENTED IN MEDICAL RECORD: ICD-10-PCS | Mod: CPTII,S$GLB,,

## 2023-03-03 RX ORDER — CEPHALEXIN 500 MG/1
500 CAPSULE ORAL EVERY 6 HOURS
Qty: 14 CAPSULE | Refills: 0 | Status: SHIPPED | OUTPATIENT
Start: 2023-03-03 | End: 2023-03-10

## 2023-03-03 NOTE — PATIENT INSTRUCTIONS
PLEASE READ YOUR DISCHARGE INSTRUCTIONS ENTIRELY AS IT CONTAINS IMPORTANT INFORMATION.    Please return here or go to the Emergency Department for any concerns or worsening of condition.    If you were prescribed antibiotics, please take them to completion.    If not allergic, please take over the counter Tylenol (Acetaminophen) and/or Motrin (Ibuprofen) as directed for control of pain and/or fever.    Please follow up with your primary care doctor or specialist as needed.    A referral was placed for you, call [phone] to schedule appointment.      Please return or see your primary care doctor if you develop new or worsening symptoms.     Please arrange follow up with your primary medical clinic as soon as possible. You must understand that you've received an Urgent Care treatment only and that you may be released before all of your medical problems are known or treated. You, the patient, will arrange for follow up as instructed. If your symptoms worsen or fail to improve you should go to the Emergency Room.

## 2023-03-03 NOTE — PROGRESS NOTES
"Subjective:       Patient ID: Liyah Fowler is a 22 y.o. male.    Vitals:  height is 5' 7" (1.702 m) and weight is 93 kg (205 lb). His oral temperature is 97.8 °F (36.6 °C). His blood pressure is 118/75 and his pulse is 68. His respiration is 20 and oxygen saturation is 97%.     Chief Complaint: Insect Bite (Left hand)    22-year-old male patient presents to the clinic with redness, swelling, and bump to left hand.  Pt reports symptoms started about 2 weeks ago and it has been progressively getting worse. Pt denies any insect bites. Pt reports that it itches some but is painful when he presses down on it.    Rash  This is a new problem. The current episode started 1 to 4 weeks ago (2 weeks). The problem has been gradually worsening since onset. The affected locations include the left hand. The rash is characterized by redness and swelling. He was exposed to nothing. Pertinent negatives include no cough, facial edema, fatigue, fever or sore throat. Past treatments include nothing. The treatment provided no relief.     Constitution: Negative for fatigue and fever.   HENT:  Negative for sore throat.    Respiratory:  Negative for cough.    Skin:  Positive for rash.     Objective:      Physical Exam   Constitutional: He is oriented to person, place, and time. He appears well-developed. He is cooperative.  Non-toxic appearance. He does not appear ill. No distress.   HENT:   Head: Normocephalic and atraumatic.   Ears:   Right Ear: Hearing, tympanic membrane, external ear and ear canal normal.   Left Ear: Hearing, tympanic membrane, external ear and ear canal normal.   Nose: Nose normal. No mucosal edema, rhinorrhea or nasal deformity. No epistaxis. Right sinus exhibits no maxillary sinus tenderness and no frontal sinus tenderness. Left sinus exhibits no maxillary sinus tenderness and no frontal sinus tenderness.   Mouth/Throat: Uvula is midline, oropharynx is clear and moist and mucous membranes are normal. No trismus in " the jaw. Normal dentition. No uvula swelling. No oropharyngeal exudate, posterior oropharyngeal edema or posterior oropharyngeal erythema.   Eyes: Conjunctivae and lids are normal. No scleral icterus.   Neck: Trachea normal and phonation normal. Neck supple. No edema present. No erythema present. No neck rigidity present.   Cardiovascular: Normal rate, regular rhythm, normal heart sounds and normal pulses.   Pulmonary/Chest: Effort normal and breath sounds normal. No respiratory distress. He has no decreased breath sounds. He has no rhonchi.   Abdominal: Normal appearance.   Musculoskeletal: Normal range of motion.         General: No deformity. Normal range of motion.   Neurological: He is alert and oriented to person, place, and time. He exhibits normal muscle tone. Coordination normal.   Skin: Skin is warm, dry, intact, not diaphoretic and not pale.         Comments: Rubbery, hard nodule to left palm with erythema and swelling, warm to touch, no fluctuance or drainage noted   Psychiatric: His speech is normal and behavior is normal. Judgment and thought content normal.   Nursing note and vitals reviewed.        Assessment:       1. Cellulitis of left upper extremity    2. Ganglion cyst of finger of left hand          Plan:         Cellulitis of left upper extremity  -     cephALEXin (KEFLEX) 500 MG capsule; Take 1 capsule (500 mg total) by mouth every 6 (six) hours. for 7 days  Dispense: 14 capsule; Refill: 0    Ganglion cyst of finger of left hand  -     Ambulatory referral/consult to Hand Surgery      Patient Instructions   PLEASE READ YOUR DISCHARGE INSTRUCTIONS ENTIRELY AS IT CONTAINS IMPORTANT INFORMATION.    Please return here or go to the Emergency Department for any concerns or worsening of condition.    If you were prescribed antibiotics, please take them to completion.    If not allergic, please take over the counter Tylenol (Acetaminophen) and/or Motrin (Ibuprofen) as directed for control of pain  and/or fever.    Please follow up with your primary care doctor or specialist as needed.    A referral was placed for you, call [phone] to schedule appointment.      Please return or see your primary care doctor if you develop new or worsening symptoms.     Please arrange follow up with your primary medical clinic as soon as possible. You must understand that you've received an Urgent Care treatment only and that you may be released before all of your medical problems are known or treated. You, the patient, will arrange for follow up as instructed. If your symptoms worsen or fail to improve you should go to the Emergency Room.

## 2023-03-29 ENCOUNTER — OFFICE VISIT (OUTPATIENT)
Dept: FAMILY MEDICINE | Facility: CLINIC | Age: 23
End: 2023-03-29
Payer: COMMERCIAL

## 2023-03-29 DIAGNOSIS — B97.89 VIRAL SINUSITIS: Primary | ICD-10-CM

## 2023-03-29 DIAGNOSIS — J32.9 VIRAL SINUSITIS: Primary | ICD-10-CM

## 2023-03-29 DIAGNOSIS — J45.31 MILD PERSISTENT ASTHMA WITH ACUTE EXACERBATION: ICD-10-CM

## 2023-03-29 PROBLEM — J45.30 MILD PERSISTENT ASTHMA WITHOUT COMPLICATION: Status: ACTIVE | Noted: 2021-06-22

## 2023-03-29 PROCEDURE — 1159F PR MEDICATION LIST DOCUMENTED IN MEDICAL RECORD: ICD-10-PCS | Mod: CPTII,95,, | Performed by: FAMILY MEDICINE

## 2023-03-29 PROCEDURE — 99213 OFFICE O/P EST LOW 20 MIN: CPT | Mod: 95,,, | Performed by: FAMILY MEDICINE

## 2023-03-29 PROCEDURE — 1160F RVW MEDS BY RX/DR IN RCRD: CPT | Mod: CPTII,95,, | Performed by: FAMILY MEDICINE

## 2023-03-29 PROCEDURE — 99213 PR OFFICE/OUTPT VISIT, EST, LEVL III, 20-29 MIN: ICD-10-PCS | Mod: 95,,, | Performed by: FAMILY MEDICINE

## 2023-03-29 PROCEDURE — 1160F PR REVIEW ALL MEDS BY PRESCRIBER/CLIN PHARMACIST DOCUMENTED: ICD-10-PCS | Mod: CPTII,95,, | Performed by: FAMILY MEDICINE

## 2023-03-29 PROCEDURE — 1159F MED LIST DOCD IN RCRD: CPT | Mod: CPTII,95,, | Performed by: FAMILY MEDICINE

## 2023-03-29 RX ORDER — FLUTICASONE PROPIONATE 220 UG/1
1 AEROSOL, METERED RESPIRATORY (INHALATION) 2 TIMES DAILY
Qty: 36 G | Refills: 3 | Status: SHIPPED | OUTPATIENT
Start: 2023-03-29 | End: 2024-03-28

## 2023-03-29 RX ORDER — ALBUTEROL SULFATE 90 UG/1
2 AEROSOL, METERED RESPIRATORY (INHALATION) EVERY 6 HOURS PRN
Qty: 18 G | Refills: 2 | Status: SHIPPED | OUTPATIENT
Start: 2023-03-29

## 2023-03-29 RX ORDER — FLUTICASONE PROPIONATE 50 MCG
1 SPRAY, SUSPENSION (ML) NASAL DAILY
Qty: 15.8 ML | Refills: 0 | Status: SHIPPED | OUTPATIENT
Start: 2023-03-29

## 2023-03-29 RX ORDER — PREDNISONE 20 MG/1
40 TABLET ORAL DAILY
Qty: 10 TABLET | Refills: 0 | Status: SHIPPED | OUTPATIENT
Start: 2023-03-29 | End: 2023-04-03

## 2023-03-29 NOTE — PROGRESS NOTES
Subjective:       Patient ID: Liyah Fowler is a 22 y.o. male.    Chief Complaint: No chief complaint on file.    Patient Active Problem List   Diagnosis    ADHD (attention deficit hyperactivity disorder)    Ptosis of both eyelids - Both Eyes    Class 1 obesity due to excess calories with body mass index (BMI) of 32.0 to 32.9 in adult    Mild persistent asthma without complication    Left wrist pain      HPI  Presents for URI symptoms x 6 days, overall improving.  3/23-3/24 - started with cough, congestion, sinus pressure. No fever. No productive cough.   Asthma worse, wheezing. Has been using albuterol every day. Nasal congestion is clear.   No COVID test taken.     Previously was using albuterol < once per week. But when this started feeling more winded with work.   No dyspnea at rest.    Review of Systems   Constitutional:  Negative for activity change and unexpected weight change.   HENT:  Positive for rhinorrhea. Negative for hearing loss and trouble swallowing.    Eyes:  Negative for discharge and visual disturbance.   Respiratory:  Positive for wheezing. Negative for chest tightness.    Cardiovascular:  Negative for chest pain and palpitations.   Gastrointestinal:  Negative for blood in stool, constipation, diarrhea and vomiting.   Endocrine: Negative for polydipsia and polyuria.   Genitourinary:  Negative for difficulty urinating, hematuria and urgency.   Musculoskeletal:  Negative for arthralgias, joint swelling and neck pain.   Neurological:  Negative for weakness and headaches.   Psychiatric/Behavioral:  Negative for confusion and dysphoric mood.    All other systems reviewed and are negative.       Objective:   There were no vitals filed for this visit.     Physical Exam  Constitutional:       General: He is not in acute distress.     Appearance: He is well-developed. He is not diaphoretic.      Comments: Exam performed as able, but limited due to the inherent nature of telemedicine visit.    HENT:       Head: Normocephalic and atraumatic.   Eyes:      Conjunctiva/sclera: Conjunctivae normal.   Pulmonary:      Effort: No respiratory distress.      Comments: No audible wheezing  No visible respiratory distress  Musculoskeletal:      Cervical back: Normal range of motion.   Skin:     Findings: No rash.      Comments: No visible rash   Neurological:      Mental Status: He is alert and oriented to person, place, and time.   Psychiatric:         Behavior: Behavior normal.         Thought Content: Thought content normal.         Judgment: Judgment normal.       Assessment:       1. Viral sinusitis    2. Mild persistent asthma with acute exacerbation          Plan:         Viral sinusitis  -     fluticasone propionate (FLONASE) 50 mcg/actuation nasal spray; 1 spray (50 mcg total) by Each Nostril route once daily.  Dispense: 15.8 mL; Refill: 0    Mild persistent asthma with acute exacerbation  -     albuterol (VENTOLIN HFA) 90 mcg/actuation inhaler; Inhale 2 puffs into the lungs every 6 (six) hours as needed for Wheezing or Shortness of Breath. Rescue  Dispense: 18 g; Refill: 2  -     fluticasone propionate (FLOVENT HFA) 220 mcg/actuation inhaler; Inhale 1 puff into the lungs 2 (two) times daily. Controller  Dispense: 36 g; Refill: 3  -     predniSONE (DELTASONE) 20 MG tablet; Take 2 tablets (40 mg total) by mouth once daily. for 5 days  Dispense: 10 tablet; Refill: 0      Appears to be resolving but having asthma exacerbation triggered by viral URI. Flovent to 220 mcg controller. Albuterol prn until exacerbation resolves. Short course of oral steroids.     Discussed expected course, general timeline of viral respiratory infection and common symptoms that may occur. OTC meds, other home comfort measures such as steam shower, and oral hydration encouraged. Concerning signs and symptoms that should prompt patient to seek more urgent or emergency medical care reviewed. Any necessary work or school notes provided.    Patient's  questions answered. Plan reviewed with patient at the end of visit. Relevant precautions to chief complaint and reasons to seek medical care or contact the office sooner reviewed with patient.     Follow up if symptoms worsen or fail to improve.     The patient location is: [place], [place]  The chief complaint leading to consultation is: viral URI, wheezing    Visit type: audiovisual    Face to Face time with patient: 10  12 minutes of total time spent on the encounter, which includes face to face time and non-face to face time preparing to see the patient (eg, review of tests), Obtaining and/or reviewing separately obtained history, Documenting clinical information in the electronic or other health record, Independently interpreting results (not separately reported) and communicating results to the patient/family/caregiver, or Care coordination (not separately reported).     Each patient to whom he or she provides medical services by telemedicine is:  (1) informed of the relationship between the physician and patient and the respective role of any other health care provider with respect to management of the patient; and (2) notified that he or she may decline to receive medical services by telemedicine and may withdraw from such care at any time.

## 2023-04-18 ENCOUNTER — PATIENT MESSAGE (OUTPATIENT)
Dept: ORTHOPEDICS | Facility: CLINIC | Age: 23
End: 2023-04-18
Payer: COMMERCIAL

## 2023-04-18 DIAGNOSIS — M79.642 LEFT HAND PAIN: Primary | ICD-10-CM

## 2025-01-09 ENCOUNTER — OFFICE VISIT (OUTPATIENT)
Dept: FAMILY MEDICINE | Facility: CLINIC | Age: 25
End: 2025-01-09
Payer: COMMERCIAL

## 2025-01-09 ENCOUNTER — PATIENT MESSAGE (OUTPATIENT)
Dept: FAMILY MEDICINE | Facility: CLINIC | Age: 25
End: 2025-01-09

## 2025-01-09 ENCOUNTER — LAB VISIT (OUTPATIENT)
Dept: LAB | Facility: HOSPITAL | Age: 25
End: 2025-01-09
Attending: FAMILY MEDICINE
Payer: COMMERCIAL

## 2025-01-09 VITALS
HEART RATE: 94 BPM | HEIGHT: 67 IN | BODY MASS INDEX: 40.42 KG/M2 | DIASTOLIC BLOOD PRESSURE: 84 MMHG | WEIGHT: 257.5 LBS | OXYGEN SATURATION: 98 % | SYSTOLIC BLOOD PRESSURE: 116 MMHG

## 2025-01-09 DIAGNOSIS — R29.818 SUSPECTED SLEEP APNEA: ICD-10-CM

## 2025-01-09 DIAGNOSIS — E66.01 CLASS 3 SEVERE OBESITY DUE TO EXCESS CALORIES WITH SERIOUS COMORBIDITY AND BODY MASS INDEX (BMI) OF 40.0 TO 44.9 IN ADULT: ICD-10-CM

## 2025-01-09 DIAGNOSIS — Z11.3 ROUTINE SCREENING FOR STI (SEXUALLY TRANSMITTED INFECTION): ICD-10-CM

## 2025-01-09 DIAGNOSIS — J45.40 MODERATE PERSISTENT ASTHMA WITHOUT COMPLICATION: ICD-10-CM

## 2025-01-09 DIAGNOSIS — E66.813 CLASS 3 SEVERE OBESITY DUE TO EXCESS CALORIES WITH SERIOUS COMORBIDITY AND BODY MASS INDEX (BMI) OF 40.0 TO 44.9 IN ADULT: ICD-10-CM

## 2025-01-09 DIAGNOSIS — Z23 IMMUNIZATION DUE: ICD-10-CM

## 2025-01-09 DIAGNOSIS — Z00.01 ENCOUNTER FOR GENERAL ADULT MEDICAL EXAMINATION WITH ABNORMAL FINDINGS: ICD-10-CM

## 2025-01-09 DIAGNOSIS — F90.2 ATTENTION DEFICIT HYPERACTIVITY DISORDER (ADHD), COMBINED TYPE: ICD-10-CM

## 2025-01-09 DIAGNOSIS — Z00.01 ENCOUNTER FOR GENERAL ADULT MEDICAL EXAMINATION WITH ABNORMAL FINDINGS: Primary | ICD-10-CM

## 2025-01-09 LAB
ALBUMIN SERPL BCP-MCNC: 4.2 G/DL (ref 3.5–5.2)
ALBUMIN SERPL BCP-MCNC: 4.2 G/DL (ref 3.5–5.2)
ALP SERPL-CCNC: 62 U/L (ref 40–150)
ALT SERPL W/O P-5'-P-CCNC: 21 U/L (ref 10–44)
ANION GAP SERPL CALC-SCNC: 10 MMOL/L (ref 8–16)
AST SERPL-CCNC: 21 U/L (ref 10–40)
BASOPHILS # BLD AUTO: 0.07 K/UL (ref 0–0.2)
BASOPHILS NFR BLD: 1.4 % (ref 0–1.9)
BILIRUB DIRECT SERPL-MCNC: 0.2 MG/DL (ref 0.1–0.3)
BILIRUB SERPL-MCNC: 0.5 MG/DL (ref 0.1–1)
BUN SERPL-MCNC: 16 MG/DL (ref 6–20)
CALCIUM SERPL-MCNC: 9.6 MG/DL (ref 8.7–10.5)
CHLORIDE SERPL-SCNC: 106 MMOL/L (ref 95–110)
CHOLEST SERPL-MCNC: 177 MG/DL (ref 120–199)
CHOLEST/HDLC SERPL: 4.2 {RATIO} (ref 2–5)
CO2 SERPL-SCNC: 23 MMOL/L (ref 23–29)
CREAT SERPL-MCNC: 1.1 MG/DL (ref 0.5–1.4)
DIFFERENTIAL METHOD BLD: NORMAL
EOSINOPHIL # BLD AUTO: 0.3 K/UL (ref 0–0.5)
EOSINOPHIL NFR BLD: 5.7 % (ref 0–8)
ERYTHROCYTE [DISTWIDTH] IN BLOOD BY AUTOMATED COUNT: 13 % (ref 11.5–14.5)
EST. GFR  (NO RACE VARIABLE): >60 ML/MIN/1.73 M^2
ESTIMATED AVG GLUCOSE: 103 MG/DL (ref 68–131)
GLUCOSE SERPL-MCNC: 86 MG/DL (ref 70–110)
HBA1C MFR BLD: 5.2 % (ref 4–5.6)
HCT VFR BLD AUTO: 51.7 % (ref 40–54)
HDLC SERPL-MCNC: 42 MG/DL (ref 40–75)
HDLC SERPL: 23.7 % (ref 20–50)
HGB BLD-MCNC: 16.7 G/DL (ref 14–18)
HIV 1+2 AB+HIV1 P24 AG SERPL QL IA: NORMAL
IMM GRANULOCYTES # BLD AUTO: 0.01 K/UL (ref 0–0.04)
IMM GRANULOCYTES NFR BLD AUTO: 0.2 % (ref 0–0.5)
LDLC SERPL CALC-MCNC: 122.8 MG/DL (ref 63–159)
LYMPHOCYTES # BLD AUTO: 1.6 K/UL (ref 1–4.8)
LYMPHOCYTES NFR BLD: 31 % (ref 18–48)
MCH RBC QN AUTO: 27.1 PG (ref 27–31)
MCHC RBC AUTO-ENTMCNC: 32.3 G/DL (ref 32–36)
MCV RBC AUTO: 84 FL (ref 82–98)
MONOCYTES # BLD AUTO: 0.4 K/UL (ref 0.3–1)
MONOCYTES NFR BLD: 8.6 % (ref 4–15)
NEUTROPHILS # BLD AUTO: 2.7 K/UL (ref 1.8–7.7)
NEUTROPHILS NFR BLD: 53.1 % (ref 38–73)
NONHDLC SERPL-MCNC: 135 MG/DL
NRBC BLD-RTO: 0 /100 WBC
PHOSPHATE SERPL-MCNC: 2.5 MG/DL (ref 2.7–4.5)
PLATELET # BLD AUTO: 242 K/UL (ref 150–450)
PMV BLD AUTO: 11.2 FL (ref 9.2–12.9)
POTASSIUM SERPL-SCNC: 4.2 MMOL/L (ref 3.5–5.1)
PROT SERPL-MCNC: 8.7 G/DL (ref 6–8.4)
RBC # BLD AUTO: 6.16 M/UL (ref 4.6–6.2)
SODIUM SERPL-SCNC: 139 MMOL/L (ref 136–145)
TREPONEMA PALLIDUM IGG+IGM AB [PRESENCE] IN SERUM OR PLASMA BY IMMUNOASSAY: NONREACTIVE
TRIGL SERPL-MCNC: 61 MG/DL (ref 30–150)
TSH SERPL DL<=0.005 MIU/L-ACNC: 1.36 UIU/ML (ref 0.4–4)
WBC # BLD AUTO: 5.13 K/UL (ref 3.9–12.7)

## 2025-01-09 PROCEDURE — 3074F SYST BP LT 130 MM HG: CPT | Mod: CPTII,S$GLB,, | Performed by: FAMILY MEDICINE

## 2025-01-09 PROCEDURE — 90480 ADMN SARSCOV2 VAC 1/ONLY CMP: CPT | Mod: S$GLB,,, | Performed by: FAMILY MEDICINE

## 2025-01-09 PROCEDURE — 3008F BODY MASS INDEX DOCD: CPT | Mod: CPTII,S$GLB,, | Performed by: FAMILY MEDICINE

## 2025-01-09 PROCEDURE — 99999 PR PBB SHADOW E&M-EST. PATIENT-LVL IV: CPT | Mod: PBBFAC,,, | Performed by: FAMILY MEDICINE

## 2025-01-09 PROCEDURE — 86593 SYPHILIS TEST NON-TREP QUANT: CPT | Performed by: FAMILY MEDICINE

## 2025-01-09 PROCEDURE — 90715 TDAP VACCINE 7 YRS/> IM: CPT | Mod: S$GLB,,, | Performed by: FAMILY MEDICINE

## 2025-01-09 PROCEDURE — 90472 IMMUNIZATION ADMIN EACH ADD: CPT | Mod: S$GLB,,, | Performed by: FAMILY MEDICINE

## 2025-01-09 PROCEDURE — 85025 COMPLETE CBC W/AUTO DIFF WBC: CPT | Performed by: FAMILY MEDICINE

## 2025-01-09 PROCEDURE — 91322 SARSCOV2 VAC 50 MCG/0.5ML IM: CPT | Mod: S$GLB,,, | Performed by: FAMILY MEDICINE

## 2025-01-09 PROCEDURE — 90656 IIV3 VACC NO PRSV 0.5 ML IM: CPT | Mod: S$GLB,,, | Performed by: FAMILY MEDICINE

## 2025-01-09 PROCEDURE — 36415 COLL VENOUS BLD VENIPUNCTURE: CPT | Mod: PO | Performed by: FAMILY MEDICINE

## 2025-01-09 PROCEDURE — 3079F DIAST BP 80-89 MM HG: CPT | Mod: CPTII,S$GLB,, | Performed by: FAMILY MEDICINE

## 2025-01-09 PROCEDURE — 1159F MED LIST DOCD IN RCRD: CPT | Mod: CPTII,S$GLB,, | Performed by: FAMILY MEDICINE

## 2025-01-09 PROCEDURE — 80061 LIPID PANEL: CPT | Performed by: FAMILY MEDICINE

## 2025-01-09 PROCEDURE — 83036 HEMOGLOBIN GLYCOSYLATED A1C: CPT | Performed by: FAMILY MEDICINE

## 2025-01-09 PROCEDURE — 84443 ASSAY THYROID STIM HORMONE: CPT | Performed by: FAMILY MEDICINE

## 2025-01-09 PROCEDURE — 90471 IMMUNIZATION ADMIN: CPT | Mod: S$GLB,,, | Performed by: FAMILY MEDICINE

## 2025-01-09 PROCEDURE — 1160F RVW MEDS BY RX/DR IN RCRD: CPT | Mod: CPTII,S$GLB,, | Performed by: FAMILY MEDICINE

## 2025-01-09 PROCEDURE — 84460 ALANINE AMINO (ALT) (SGPT): CPT | Performed by: FAMILY MEDICINE

## 2025-01-09 PROCEDURE — 87389 HIV-1 AG W/HIV-1&-2 AB AG IA: CPT | Performed by: FAMILY MEDICINE

## 2025-01-09 PROCEDURE — 99395 PREV VISIT EST AGE 18-39: CPT | Mod: 25,S$GLB,, | Performed by: FAMILY MEDICINE

## 2025-01-09 PROCEDURE — 80069 RENAL FUNCTION PANEL: CPT | Performed by: FAMILY MEDICINE

## 2025-01-09 RX ORDER — FLUTICASONE PROPIONATE AND SALMETEROL 250; 50 UG/1; UG/1
1 POWDER RESPIRATORY (INHALATION) 2 TIMES DAILY
Qty: 180 EACH | Refills: 3 | Status: SHIPPED | OUTPATIENT
Start: 2025-01-09 | End: 2026-01-09

## 2025-01-09 RX ORDER — ALBUTEROL SULFATE 90 UG/1
2 INHALANT RESPIRATORY (INHALATION) EVERY 6 HOURS PRN
Qty: 18 G | Refills: 3 | Status: SHIPPED | OUTPATIENT
Start: 2025-01-09

## 2025-01-09 RX ORDER — FLUTICASONE PROPIONATE 50 MCG
1 SPRAY, SUSPENSION (ML) NASAL DAILY
Qty: 15.8 ML | Refills: 0 | Status: CANCELLED | OUTPATIENT
Start: 2025-01-09

## 2025-01-09 RX ORDER — LISDEXAMFETAMINE DIMESYLATE 40 MG/1
40 CAPSULE ORAL EVERY MORNING
Qty: 90 CAPSULE | Refills: 0 | Status: SHIPPED | OUTPATIENT
Start: 2025-01-09

## 2025-01-09 NOTE — PROGRESS NOTES
Subjective:         Patient ID: Liyah Fowler is a 24 y.o. male.    Chief Complaint: Follow-up    Patient Active Problem List   Diagnosis    Attention deficit hyperactivity disorder (ADHD), combined type    Ptosis of both eyelids - Both Eyes    Class 3 severe obesity due to excess calories with serious comorbidity and body mass index (BMI) of 40.0 to 44.9 in adult    Moderate persistent asthma without complication    Left wrist pain      Shortness of Breath  This is a chronic problem. The current episode started more than 1 year ago. The problem occurs constantly. The problem has been gradually worsening. Associated symptoms include orthopnea, PND, sputum production and wheezing. Pertinent negatives include no abdominal pain, chest pain, claudication, coryza, ear pain, fever, headaches, hemoptysis, leg pain, leg swelling, neck pain, rash, rhinorrhea, sore throat, swollen glands, syncope or vomiting. The symptoms are aggravated by exercise, any activity and lying flat. He has tried beta agonist inhalers and steroid inhalers for the symptoms. The treatment provided mild relief. His past medical history is significant for asthma. There is no history of allergies, aspirin allergies, bronchiolitis, CAD, chronic lung disease, COPD, DVT, a heart failure, PE, pneumonia or a recent surgery.       Liyah is a 24 y.o. male    History of Present Illness    CHIEF COMPLAINT:  Liyah presents today for asthma exacerbation.    ASTHMA:  He reports increased shortness of breath and wheezing since March. He denies chest pain or productive cough. He has been using his brother's inhaler and has a nebulizer machine that he has not used recently. He continues Advair inhaler daily and Albuterol inhaler as needed. His breathing difficulties are affecting his sleep, causing nocturnal awakenings.    DIET AND EXERCISE:  He exercises for at least 40 minutes daily and has achieved weight reduction from 275 to 257 lbs.    FAMILY  "HISTORY:  Brother has kidney issues.    OCCUPATIONAL HISTORY:  He works at a barbecue catering business.       Objective:     Vitals:    01/09/25 0714   BP: 116/84   BP Location: Left arm   Patient Position: Sitting   Pulse: 94   SpO2: 98%   Weight: 116.8 kg (257 lb 8 oz)   Height: 5' 7" (1.702 m)         Physical Exam  Vitals and nursing note reviewed.   Constitutional:       General: He is not in acute distress.     Appearance: Normal appearance. He is not ill-appearing, toxic-appearing or diaphoretic.   HENT:      Head: Normocephalic and atraumatic.   Eyes:      General: No scleral icterus.     Conjunctiva/sclera: Conjunctivae normal.   Cardiovascular:      Rate and Rhythm: Normal rate.      Heart sounds: Normal heart sounds. No murmur heard.  Pulmonary:      Effort: Pulmonary effort is normal. No respiratory distress.      Breath sounds: Wheezing present.      Comments: Mild exp wheezing  Skin:     Coloration: Skin is not pale.   Neurological:      Mental Status: He is alert. Mental status is at baseline.   Psychiatric:         Attention and Perception: Attention and perception normal.         Mood and Affect: Mood and affect normal.         Speech: Speech normal.         Behavior: Behavior normal.         Cognition and Memory: Cognition and memory normal.         Judgment: Judgment normal.       Assessment:       1. Encounter for general adult medical examination with abnormal findings    2. Moderate persistent asthma without complication    3. Class 3 severe obesity due to excess calories with serious comorbidity and body mass index (BMI) of 40.0 to 44.9 in adult    4. Routine screening for STI (sexually transmitted infection)    5. Suspected sleep apnea    6. Attention deficit hyperactivity disorder (ADHD), combined type    7. Immunization due          Plan:   Recent relevant labs results reviewed with patient.         Assessment & Plan    Assessed patient's asthma as uncontrolled, requiring daily inhaler " therapy  Determined combination inhaler warranted for better asthma control  Evaluated kidney function due to family history concerns  Considered recent weight gain as potential factor in asthma exacerbation  Assessed lungs, noting extracorporeal wheezing without signs of infection  Recommend sleep study to rule out sleep apnea    ASTHMA:  - Assessed the patient's asthma as uncontrolled, requiring daily inhaler use.  - Noted the patient reports asthma exacerbation since March, experiencing increased dyspnea and feeling winded.  - Performed physical exam, noting extracorporeal wheezing in the lungs.  - Prescribed Advair, a combination inhaler, to be used twice daily, regardless of symptoms.  - Continued albuterol prescription for use as needed for acute symptoms.  - Educated the patient on the importance of consistent inhaler use for long-term pulmonary health.  - Discussed asthma exacerbations and their potential long-term impact on pulmonary function.  - Instructed on proper inhaler technique and use of spacer device.  - Explained the role of daily inhaler in preventing symptoms versus rescue inhaler for acute relief.  - Requested the patient to demonstrate proper inhaler technique at the next visit.    SUSPECTED OBSTRUCTIVE SLEEP APNEA:  - Noted the patient reports waking up in the middle of the night and snoring.  - Assessed the need for a polysomnography to evaluate for sleep apnea.  - Ordered a sleep study to diagnose potential obstructive sleep apnea.    WEIGHT MANAGEMENT:  - Khyron to continue current exercise regimen of 40 minutes daily.    MEDICATIONS/SUPPLEMENTS:  - Refilled Vyvanse: 40 mg daily, 90-day supply.    LABS:  - Blood work ordered.    COVID-19 VACCINATION:  - Recommend COVID-19 vaccination due to the patient's pulmonary condition.  - COVID vaccination administered in office.    INFLUENZA VACCINATION:  - Recommend influenza vaccination due to the patient's pulmonary condition.  - Flu vaccination  administered in office.    TETANUS VACCINATION:  - Recommend tetanus vaccination due to the patient's pulmonary condition.  - Tetanus vaccination administered in office.    FOLLOW UP:  - Instructed the patient to message through the patient portal on Monday to confirm receipt of prescription.  - Advised to contact the office if any issues arise with inhaler prescription or insurance coverage.  - Follow up in 3 months.  - Provided work note for today's visit.         1. Encounter for general adult medical examination with abnormal findings  -     Hepatic Function Panel; Future; Expected date: 01/09/2025  -     Renal Function Panel; Future; Expected date: 01/09/2025  -     Lipid Panel; Future; Expected date: 01/09/2025  -     TSH; Future; Expected date: 01/09/2025  -     Hemoglobin A1C; Future; Expected date: 01/09/2025  -     CBC Auto Differential; Future; Expected date: 01/09/2025  - Risk and age appropriate anticipatory guidance. HM reviewed and updated. Recommendations discussed with patient as appropriate.     2. Moderate persistent asthma without complication  -     albuterol (VENTOLIN HFA) 90 mcg/actuation inhaler; Inhale 2 puffs into the lungs every 6 (six) hours as needed for Wheezing or Shortness of Breath. Rescue  Dispense: 18 g; Refill: 3  -     fluticasone-salmeterol 250-50 mcg/dose (WIXELA INHUB) 250-50 mcg/dose diskus inhaler; Inhale 1 puff into the lungs 2 (two) times a day. Controller  Dispense: 180 each; Refill: 3  -     inhalation spacing device; Use with inhaler as needed  Dispense: 1 each; Refill: 0  Start on controller inhaler. Use daily.   Close follow up in 3 months.     3. Class 3 severe obesity due to excess calories with serious comorbidity and body mass index (BMI) of 40.0 to 44.9 in adult  Continue lifestyle modifications    4. Routine screening for STI (sexually transmitted infection)  -     Treponema Pallidium Antibodies IgG, IgM; Future; Expected date: 01/09/2025  -     HIV 1/2 Ag/Ab  (4th Gen); Future; Expected date: 01/09/2025  -     C. trachomatis/N. gonorrhoeae by AMP DNA; Future; Expected date: 01/09/2025    5. Suspected sleep apnea  If with controlled asthma, breathing at night still an issue, order sleep med referral/sleep study.     6. Attention deficit hyperactivity disorder (ADHD), combined type  -     lisdexamfetamine (VYVANSE) 40 MG Cap; Take 1 capsule (40 mg total) by mouth every morning.  Dispense: 90 capsule; Refill: 0    7. Immunization due  -     influenza (Flulaval, Fluzone, Fluarix) 45 mcg/0.5 mL IM vaccine (> or = 6 mo) 0.5 mL  -     COVID-19 (Moderna) 50 mcg/0.5 mL IM vaccine (>/= 13 yo) 0.5 mL  -     Tdap (BOOSTRIX) vaccine injection 0.5 mL    Patient's questions answered. Plan reviewed with patient at the end of visit. Relevant precautions to chief complaint and reasons to seek further medical care or to contact the office sooner reviewed with patient.     Follow up in about 3 months (around 4/9/2025) for asthma follow up.        Part of this note was dictated using voice recognition software. Please excuse any typographical errors.     This note was generated with the assistance of ambient listening technology. Verbal consent was obtained by the patient and accompanying visitor(s) for the recording of patient appointment to facilitate this note. I attest to having reviewed and edited the generated note for accuracy, though some syntax or spelling errors may persist. Please contact the author of this note for any clarification.

## 2025-01-28 DIAGNOSIS — F90.2 ATTENTION DEFICIT HYPERACTIVITY DISORDER (ADHD), COMBINED TYPE: ICD-10-CM

## 2025-01-28 RX ORDER — LISDEXAMFETAMINE DIMESYLATE 40 MG/1
40 CAPSULE ORAL EVERY MORNING
Qty: 90 CAPSULE | Refills: 0 | Status: SHIPPED | OUTPATIENT
Start: 2025-01-28

## 2025-01-28 NOTE — TELEPHONE ENCOUNTER
No care due was identified.  Health [facility] Embedded Care Due Messages. Reference number: [id number].   1/28/2025 10:32:26 AM CST

## 2025-02-19 ENCOUNTER — PATIENT MESSAGE (OUTPATIENT)
Dept: FAMILY MEDICINE | Facility: CLINIC | Age: 25
End: 2025-02-19
Payer: COMMERCIAL

## 2025-02-20 DIAGNOSIS — F90.2 ATTENTION DEFICIT HYPERACTIVITY DISORDER (ADHD), COMBINED TYPE: ICD-10-CM

## 2025-02-20 NOTE — TELEPHONE ENCOUNTER
No care due was identified.  Health [facility] Embedded Care Due Messages. Reference number: [id number].   2/20/2025 10:49:28 AM CST

## 2025-02-21 RX ORDER — LISDEXAMFETAMINE DIMESYLATE 40 MG/1
40 CAPSULE ORAL EVERY MORNING
Qty: 90 CAPSULE | Refills: 0 | Status: SHIPPED | OUTPATIENT
Start: 2025-02-21

## 2025-03-10 ENCOUNTER — PATIENT MESSAGE (OUTPATIENT)
Dept: FAMILY MEDICINE | Facility: CLINIC | Age: 25
End: 2025-03-10
Payer: COMMERCIAL

## 2025-08-20 ENCOUNTER — PATIENT MESSAGE (OUTPATIENT)
Dept: FAMILY MEDICINE | Facility: CLINIC | Age: 25
End: 2025-08-20